# Patient Record
Sex: FEMALE | Race: WHITE | NOT HISPANIC OR LATINO | Employment: FULL TIME | URBAN - METROPOLITAN AREA
[De-identification: names, ages, dates, MRNs, and addresses within clinical notes are randomized per-mention and may not be internally consistent; named-entity substitution may affect disease eponyms.]

---

## 2023-12-19 ENCOUNTER — OFFICE VISIT (OUTPATIENT)
Dept: CARDIOLOGY | Facility: CLINIC | Age: 53
End: 2023-12-19
Payer: COMMERCIAL

## 2023-12-19 VITALS
DIASTOLIC BLOOD PRESSURE: 82 MMHG | HEIGHT: 64 IN | BODY MASS INDEX: 27.14 KG/M2 | TEMPERATURE: 98 F | WEIGHT: 159 LBS | SYSTOLIC BLOOD PRESSURE: 127 MMHG | HEART RATE: 80 BPM

## 2023-12-19 DIAGNOSIS — I36.1 NONRHEUMATIC TRICUSPID VALVE REGURGITATION: Primary | ICD-10-CM

## 2023-12-19 DIAGNOSIS — I49.1 PAC (PREMATURE ATRIAL CONTRACTION): ICD-10-CM

## 2023-12-19 PROCEDURE — 99214 OFFICE O/P EST MOD 30 MIN: CPT | Performed by: INTERNAL MEDICINE

## 2023-12-19 PROCEDURE — 1036F TOBACCO NON-USER: CPT | Performed by: INTERNAL MEDICINE

## 2023-12-19 RX ORDER — SUMATRIPTAN 20 MG/1
SPRAY NASAL EVERY 24 HOURS
COMMUNITY

## 2023-12-19 RX ORDER — METOPROLOL SUCCINATE 25 MG/1
25 TABLET, EXTENDED RELEASE ORAL DAILY
COMMUNITY
End: 2024-01-15

## 2023-12-19 NOTE — PROGRESS NOTES
"Chief Complaint:   Valve Disorder     History of Present Illness     Antonia Reeves is a 53 y.o. female presenting with TR.  Dx Moderate TR last year - had negative stress echo and negative pulmonary eval.  She has gained 25# since last year and feels that her NIELSON is worse.  Mild ankle edema.  No HX of PAH.       Previous History     Past Medical History:  She has a past medical history of Nonrheumatic tricuspid valve regurgitation (12/19/2023) and PAC (premature atrial contraction) (12/19/2023).    Past Surgical History:  She has no past surgical history on file.      Social History:  She reports that she has quit smoking. Her smoking use included cigarettes. She has a 2.50 pack-year smoking history. She has never used smokeless tobacco. No history on file for alcohol use and drug use.    Family History:  No family history on file.     Allergies:  Patient has no known allergies.    Outpatient Medications:  Current Outpatient Medications   Medication Instructions    metoprolol succinate XL (TOPROL-XL) 25 mg, oral, Daily    SUMAtriptan (Imitrex) 20 mg/actuation nasal spray Every 24 hours       Physical Examination   Vitals:  Visit Vitals  /82 (BP Location: Right arm)   Pulse 80   Temp 36.7 °C (98 °F)   Ht 1.626 m (5' 4\")   Wt 72.1 kg (159 lb)   BMI 27.29 kg/m²   Smoking Status Former   BSA 1.8 m²      Physical Exam  Vitals reviewed.   Constitutional:       General: She is not in acute distress.     Appearance: Normal appearance.   HENT:      Head: Normocephalic and atraumatic.      Nose: Nose normal.   Eyes:      Conjunctiva/sclera: Conjunctivae normal.   Cardiovascular:      Rate and Rhythm: Normal rate and regular rhythm.      Pulses: Normal pulses.      Heart sounds: No murmur heard.  Pulmonary:      Effort: Pulmonary effort is normal. No respiratory distress.      Breath sounds: Normal breath sounds. No wheezing, rhonchi or rales.   Abdominal:      General: Bowel sounds are normal. There is no distension.    " "  Palpations: Abdomen is soft.      Tenderness: There is no abdominal tenderness.   Musculoskeletal:         General: No swelling.      Right lower leg: No edema.      Left lower leg: No edema.   Skin:     General: Skin is warm and dry.      Capillary Refill: Capillary refill takes less than 2 seconds.   Neurological:      General: No focal deficit present.      Mental Status: She is alert.   Psychiatric:         Mood and Affect: Mood normal.            Labs/Imaging/Cardiac Studies     Last Labs:  CBC -  No results found for: \"WBC\", \"HGB\", \"HCT\", \"MCV\", \"PLT\"    CMP -  No results found for: \"CALCIUM\", \"PHOS\", \"PROT\", \"ALBUMIN\", \"AST\", \"ALT\", \"ALKPHOS\", \"BILITOT\"    LIPID PANEL -   No results found for: \"CHOL\", \"HDL\", \"CHHDL\", \"LDL\", \"VLDL\", \"TRIG\", \"NHDL\"    RENAL FUNCTION PANEL -   No results found for: \"GLU\", \"K\", \"CHLOR\", \"PHOS\"    No results found for: \"BNP\", \"HGBA1C\"    ECG:    Echo:  No echocardiogram results found for the past 12 months       Assessment and Recommendations     Assessment/Plan     1. Nonrheumatic tricuspid valve regurgitation  Moderate TR of unknown etiology with progressive NIELSON, negative pulmonary evaluation.  No Hx of weight loss drugs or carcinoid.  Stress echo 1/2023 negative with mild-moderate TE and normal RVSP, negative bubble study. Will recheck Echo and if still with TR, then L/RHC to evaluate for PAH or CAD, shunt, etc.  - Transthoracic echo (TTE) complete; Future    2. PAC (premature atrial contraction)  Well controlled on Toprol.  - Transthoracic echo (TTE) complete; Future       Farrukh Segundo MD    Exclusive of any other services or procedures performed, I, Farrukh Segundo MD , spent 30 minutes in duration for this visit today.  This time consisted of chart review, obtaining history, and/or performing the exam as documented above as well as documenting the clinical information for the encounter in the electronic record, discussing treatment options, plans, and/or goals with patient, " family, and/or caregiver, refilling medications, updating the electronic record, ordering medicines, lab work, imaging, referrals, and/or procedures as documented above and communicating with other [facility] professionals. I have discussed the results of laboratory, radiology, and cardiology studies with the patient and their family/caregiver.

## 2023-12-28 ENCOUNTER — HOSPITAL ENCOUNTER (OUTPATIENT)
Dept: CARDIOLOGY | Facility: CLINIC | Age: 53
Discharge: HOME | End: 2023-12-28
Payer: COMMERCIAL

## 2023-12-28 VITALS — SYSTOLIC BLOOD PRESSURE: 138 MMHG | DIASTOLIC BLOOD PRESSURE: 81 MMHG

## 2023-12-28 DIAGNOSIS — R06.02 SHORTNESS OF BREATH: ICD-10-CM

## 2023-12-28 DIAGNOSIS — I49.1 PAC (PREMATURE ATRIAL CONTRACTION): ICD-10-CM

## 2023-12-28 DIAGNOSIS — I36.1 NONRHEUMATIC TRICUSPID VALVE REGURGITATION: ICD-10-CM

## 2023-12-28 LAB
AORTIC VALVE PEAK VELOCITY: 1.42
AV PEAK GRADIENT: 8.1
AVA (PEAK VEL): 2.19
EJECTION FRACTION APICAL 4 CHAMBER: 57.9
EJECTION FRACTION: 61
LEFT ATRIUM VOLUME AREA LENGTH INDEX BSA: 33.7
LEFT VENTRICLE INTERNAL DIMENSION DIASTOLE: 4.61 (ref 3.5–6)
LEFT VENTRICULAR OUTFLOW TRACT DIAMETER: 1.86
MITRAL VALVE E/A RATIO: 0.89
MITRAL VALVE E/E' RATIO: 6.93
RIGHT VENTRICLE FREE WALL PEAK S': 14
RIGHT VENTRICLE PEAK SYSTOLIC PRESSURE: 26.1
TRICUSPID ANNULAR PLANE SYSTOLIC EXCURSION: 2.7

## 2023-12-28 PROCEDURE — 93306 TTE W/DOPPLER COMPLETE: CPT

## 2023-12-28 PROCEDURE — 93306 TTE W/DOPPLER COMPLETE: CPT | Performed by: INTERNAL MEDICINE

## 2023-12-29 ENCOUNTER — LAB (OUTPATIENT)
Dept: LAB | Facility: LAB | Age: 53
End: 2023-12-29
Payer: COMMERCIAL

## 2023-12-29 DIAGNOSIS — R06.02 SHORTNESS OF BREATH: ICD-10-CM

## 2023-12-29 DIAGNOSIS — R06.02 SHORTNESS OF BREATH: Primary | ICD-10-CM

## 2023-12-29 PROCEDURE — 36415 COLL VENOUS BLD VENIPUNCTURE: CPT

## 2023-12-29 PROCEDURE — 83880 ASSAY OF NATRIURETIC PEPTIDE: CPT

## 2023-12-30 LAB — BNP SERPL-MCNC: 26 PG/ML (ref 0–99)

## 2024-01-13 DIAGNOSIS — I49.1 PAC (PREMATURE ATRIAL CONTRACTION): Primary | ICD-10-CM

## 2024-01-15 RX ORDER — METOPROLOL SUCCINATE 25 MG/1
25 TABLET, EXTENDED RELEASE ORAL DAILY
Qty: 90 TABLET | Refills: 3 | Status: SHIPPED | OUTPATIENT
Start: 2024-01-15

## 2024-02-23 RX ORDER — VALACYCLOVIR HYDROCHLORIDE 500 MG/1
TABLET, FILM COATED ORAL
COMMUNITY
Start: 2022-03-17

## 2024-02-23 RX ORDER — TACROLIMUS 0.3 MG/G
1 OINTMENT TOPICAL
COMMUNITY
Start: 2020-04-09

## 2024-03-13 ENCOUNTER — APPOINTMENT (OUTPATIENT)
Dept: PULMONOLOGY | Facility: CLINIC | Age: 54
End: 2024-03-13
Payer: COMMERCIAL

## 2024-04-01 ENCOUNTER — OFFICE VISIT (OUTPATIENT)
Dept: PULMONOLOGY | Facility: CLINIC | Age: 54
End: 2024-04-01
Payer: COMMERCIAL

## 2024-04-01 VITALS
TEMPERATURE: 98.4 F | OXYGEN SATURATION: 97 % | RESPIRATION RATE: 18 BRPM | HEIGHT: 64 IN | SYSTOLIC BLOOD PRESSURE: 116 MMHG | WEIGHT: 155 LBS | BODY MASS INDEX: 26.46 KG/M2 | HEART RATE: 74 BPM | DIASTOLIC BLOOD PRESSURE: 78 MMHG

## 2024-04-01 DIAGNOSIS — J12.82 PNEUMONIA DUE TO COVID-19 VIRUS: Primary | ICD-10-CM

## 2024-04-01 DIAGNOSIS — U07.1 PNEUMONIA DUE TO COVID-19 VIRUS: Primary | ICD-10-CM

## 2024-04-01 DIAGNOSIS — R06.02 SHORTNESS OF BREATH: ICD-10-CM

## 2024-04-01 PROCEDURE — 99204 OFFICE O/P NEW MOD 45 MIN: CPT | Performed by: STUDENT IN AN ORGANIZED HEALTH CARE EDUCATION/TRAINING PROGRAM

## 2024-04-01 PROCEDURE — 1036F TOBACCO NON-USER: CPT | Performed by: STUDENT IN AN ORGANIZED HEALTH CARE EDUCATION/TRAINING PROGRAM

## 2024-04-01 PROCEDURE — 99214 OFFICE O/P EST MOD 30 MIN: CPT | Performed by: STUDENT IN AN ORGANIZED HEALTH CARE EDUCATION/TRAINING PROGRAM

## 2024-04-01 RX ORDER — ALBUTEROL SULFATE 90 UG/1
2 AEROSOL, METERED RESPIRATORY (INHALATION) EVERY 4 HOURS PRN
Qty: 8.5 G | Refills: 5 | Status: SHIPPED | OUTPATIENT
Start: 2024-04-01 | End: 2025-04-01

## 2024-04-01 ASSESSMENT — ENCOUNTER SYMPTOMS
ARTHRALGIAS: 0
FEVER: 0
ABDOMINAL PAIN: 0
PALPITATIONS: 1
CHILLS: 0
ABDOMINAL DISTENTION: 0
UNEXPECTED WEIGHT CHANGE: 0

## 2024-04-01 ASSESSMENT — PAIN SCALES - GENERAL: PAINLEVEL: 0-NO PAIN

## 2024-04-01 NOTE — LETTER
April 1, 2024     Farrukh Segundo MD  [address] [place] [address]    Patient: Antonia Reeves   YOB: 1970   Date of Visit: 4/1/2024       Dear Dr. Farrukh Segundo MD:    Thank you for referring Antonia Reeves to me for evaluation. Below are my notes for this consultation.  If you have questions, please do not hesitate to call me. I look forward to following your patient along with you.       Sincerely,     Courtney Temple MD      CC: No Recipients  ______________________________________________________________________________________      Department of Medicine I Division of Pulmonary, Critical Care, and Sleep Medicine   [address] [place] [address]  Phone: [phone]  Fax: [phone]    History of Present Illness   Antonia Reeves is a 53 y.o. female presenting with dyspnea    Referred by:  Farrukh Segundo MD, for dyspnea. I have independently interviewed and examined the patient in the office and reviewed available records.    Follows with Dr. Segundo for TR, PAC's, tentative plan for C/RHC     Previously seen by Dr. Choudhury in April 2023 for similar symptoms--workup at the time included PFT (which was normal) and walk test (which was normal)--echo with moderate TR at the time, repeat demonstrates mild TR    Endorses a history of asthma many years ago--not currently on inhalers     Did have covid infection in August 2021   Did notice after covid infection having more frequent PAC and trouble with her breathing. Feels like she has not gotten back to baseline     Does endorse swelling in her hands and feet   Has also endorsed a rapid weight gain after menopause     Dyspnea happens with exertion---ie with stairs, also when her heart races, orthopnea; denies PND  Denies any cough   Occasional post nasal drip   Denies any rash   Pulmonary medications: none       Review of Systems  Review of Systems   Constitutional:  Negative for chills, fever and unexpected weight change.  "  Respiratory:          As per hpi   Cardiovascular:  Positive for palpitations and leg swelling. Negative for chest pain.   Gastrointestinal:  Negative for abdominal distention and abdominal pain.   Musculoskeletal:  Negative for arthralgias and gait problem.   Skin:  Negative for rash.     All other review of systems are negative and/or non-contributory.    Past Medical History   She has a past medical history of Nonrheumatic tricuspid valve regurgitation (12/19/2023) and PAC (premature atrial contraction) (12/19/2023).    Immunizations     Immunization History   Administered Date(s) Administered   • Pfizer Purple Cap SARS-CoV-2 12/01/2021, 12/28/2021       Medications and Allergies     Current Outpatient Medications   Medication Instructions   • metoprolol succinate XL (TOPROL-XL) 25 mg, oral, Daily   • SUMAtriptan (Imitrex) 20 mg/actuation nasal spray Every 24 hours   • tacrolimus (Protopic) 0.03 % ointment 1 Application, Topical   • valACYclovir (Valtrex) 500 mg tablet oral      Patient has no known allergies.    Social History   She reports that she has quit smoking. Her smoking use included cigarettes. She has a 2.50 pack-year smoking history. She has never used smokeless tobacco. No history on file for alcohol use and drug use.    Smoking History: 10 year smoking history (quit 25 years ago). No vaping. Denies any methamphetamine use.  Remote marijuana and cocaine use. 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  Has one dog at home and two cats       Family History   Mom:  bullous pemphigoid (was on infusions)   Dad: passed when she was young (not sure of any medical conditions)         Surgical History   She has no past surgical history on file.    Physical Exam   /78 (BP Location: Right arm, Patient Position: Sitting)   Pulse 74   Temp 36.9 °C (98.4 °F) (Temporal)   Resp 18   Ht 1.626 m (5' 4\")   Wt 70.3 kg (155 lb)   SpO2 97%   BMI " 26.61 kg/m²      Physical Exam  Vitals reviewed.   Constitutional:       General: She is awake.   Cardiovascular:      Rate and Rhythm: Normal rate and regular rhythm.   Pulmonary:      Effort: Pulmonary effort is normal.      Breath sounds: Normal breath sounds.   Musculoskeletal:      Comments: BL nonpitting edema    Neurological:      Mental Status: She is alert and oriented to person, place, and time.   Psychiatric:         Attention and Perception: Attention and perception normal.         Behavior: Behavior normal.          Results   Pulmonary Function Tests:  3/24/2023  FEV1/FVC: 77 FEV1: 105% predicted  T% RV/T% pred   DLCO: 115% predicted     Chest Radiograph:  CHEST 2 VIEW     Narrative  Interpreted By:  CHRISTIANO HOLT MD  MRN: [id number]  Patient Name: ERNESTINA RIZVI    STUDY:   CHEST 2 VIEW PA AND LAT;  2023 10:03 am    INDICATION:  DCH02.    COMPARISON:  None.    ACCESSION NUMBER(S):  [id number]    ORDERING CLINICIAN:  LISA CARLOS    FINDINGS:  CARDIOMEDIASTINAL SILHOUETTE AND VASCULATURE:    Cardiac size:  Within normal limits considering technique    Aortic shadow:  Within normal limits considering technique    Mediastinal contours: Within normal limits considering technique    Pulmonary vasculature:  The central vasculature is unremarkable    LUNGS:  Lungs are clear.    ABDOMEN AND OTHER FINDINGS:  No remarkable upper abdominal findings.    BONES:  No acute osseous changes.    Impression  1.  No active cardiopulmonary disease.      Chest CT Scan:  No results found for this or any previous visit from the past 365 days.       ECHO:  2023  PHYSICIAN INTERPRETATION:  Left Ventricle: The left ventricular systolic function is normal. There are no regional wall motion abnormalities. The left ventricular cavity size is normal. Spectral Doppler shows an impaired relaxation pattern of left ventricular diastolic filling.  Left Atrium: The left atrium is mildly dilated.  Right Ventricle: The  "right ventricle is upper limits of normal in size. There is normal right ventricular global systolic function.  Right Atrium: The right atrium is normal in size.  Aortic Valve: The aortic valve appears structurally normal. There is no evidence of aortic valve regurgitation. The peak instantaneous gradient of the aortic valve is 8.1 mmHg.  Mitral Valve: The mitral valve is normal in structure. There is mild mitral valve regurgitation.  Tricuspid Valve: The tricuspid valve is structurally normal. There is mild tricuspid regurgitation.  Pulmonic Valve: The pulmonic valve is structurally normal. There is no indication of pulmonic valve regurgitation.  Pericardium: There is no pericardial effusion noted.  Aorta: The aortic root is normal.  In comparison to the previous echocardiogram(s): There are no prior studies on this patient for comparison purposes.        CONCLUSIONS:   1. Left ventricular systolic function is normal.   2. Spectral Doppler shows an impaired relaxation pattern of left ventricular diastolic filling.       Labs:  No results found for: \"WBC\", \"HGB\", \"HCT\", \"MCV\", \"PLT\"    No results found for: \"CREATININE\", \"BUN\", \"NA\", \"K\", \"CL\", \"CO2\"     No results found for: \"ZORAN\", \"RF\", \"SEDRATE\"     IgE: No results found for: \"IGE\"   Respiratory Allergy Panel:  AEC: No results found for: \"EOSABS\", \"EOSPCT\"    Cultures:  No results found for: \"AFBCX\"   No results found for: \"RESPCULTCYFI\"    No results found for the last 90 days.  C     Assessment and Plan       53 year old woman with prior history of asthma, PAC, Tricuspid regurgitation and persistent dyspnea following covid infection in August 2021--mostly with activity.  PFTs and walk test are unremarkable.  She is following with cardiology and is on metoprolol for her PAC, there is possible plan for LHC/RHC.      Recommendations   CPET with spirometry at peak exercise   Pulm rehab referral (post covid)   Albuterol inh prior to activity   Consider RHC if " testing/treatment  above equivocal           Follow-up:  3 months or sooner     Courtney Temple MD  04/01/2024

## 2024-04-01 NOTE — PROGRESS NOTES
Department of Medicine I Division of Pulmonary, Critical Care, and Sleep Medicine   [id number] [place] [address]  Phone: [phone]  Fax: [phone]    History of Present Illness   Antonia Reeves is a 53 y.o. female presenting with dyspnea    Referred by:  Farrukh Segundo MD, for dyspnea. I have independently interviewed and examined the patient in the office and reviewed available records.    Follows with Dr. Segundo for TR, PAC's, tentative plan for LHC/RHC     Previously seen by Dr. Choudhury in April 2023 for similar symptoms--workup at the time included PFT (which was normal) and walk test (which was normal)--echo with moderate TR at the time, repeat demonstrates mild TR    Endorses a history of asthma many years ago--not currently on inhalers     Did have covid infection in August 2021   Did notice after covid infection having more frequent PAC and trouble with her breathing. Feels like she has not gotten back to baseline     Does endorse swelling in her hands and feet   Has also endorsed a rapid weight gain after menopause     Dyspnea happens with exertion---ie with stairs, also when her heart races, orthopnea; denies PND  Denies any cough   Occasional post nasal drip   Denies any rash   Pulmonary medications: none       Review of Systems  Review of Systems   Constitutional:  Negative for chills, fever and unexpected weight change.   Respiratory:          As per hpi   Cardiovascular:  Positive for palpitations and leg swelling. Negative for chest pain.   Gastrointestinal:  Negative for abdominal distention and abdominal pain.   Musculoskeletal:  Negative for arthralgias and gait problem.   Skin:  Negative for rash.     All other review of systems are negative and/or non-contributory.    Past Medical History   She has a past medical history of Nonrheumatic tricuspid valve regurgitation (12/19/2023) and PAC (premature atrial contraction) (12/19/2023).    Immunizations  "    Immunization History   Administered Date(s) Administered    Pfizer Purple Cap SARS-CoV-2 12/01/2021, 12/28/2021       Medications and Allergies     Current Outpatient Medications   Medication Instructions    metoprolol succinate XL (TOPROL-XL) 25 mg, oral, Daily    SUMAtriptan (Imitrex) 20 mg/actuation nasal spray Every 24 hours    tacrolimus (Protopic) 0.03 % ointment 1 Application, Topical    valACYclovir (Valtrex) 500 mg tablet oral      Patient has no known allergies.    Social History   She reports that she has quit smoking. Her smoking use included cigarettes. She has a 2.50 pack-year smoking history. She has never used smokeless tobacco. No history on file for alcohol use and drug use.    Smoking History: 10 year smoking history (quit 25 years ago). No vaping. Denies any methamphetamine use.  Remote marijuana and cocaine use. 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  Has one dog at home and two cats       Family History   Mom:  bullous pemphigoid (was on infusions)   Dad: passed when she was young (not sure of any medical conditions)         Surgical History   She has no past surgical history on file.    Physical Exam   /78 (BP Location: Right arm, Patient Position: Sitting)   Pulse 74   Temp 36.9 °C (98.4 °F) (Temporal)   Resp 18   Ht 1.626 m (5' 4\")   Wt 70.3 kg (155 lb)   SpO2 97%   BMI 26.61 kg/m²      Physical Exam  Vitals reviewed.   Constitutional:       General: She is awake.   Cardiovascular:      Rate and Rhythm: Normal rate and regular rhythm.   Pulmonary:      Effort: Pulmonary effort is normal.      Breath sounds: Normal breath sounds.   Musculoskeletal:      Comments: BL nonpitting edema    Neurological:      Mental Status: She is alert and oriented to person, place, and time.   Psychiatric:         Attention and Perception: Attention and perception normal.         Behavior: Behavior normal.          Results "   Pulmonary Function Tests:  3/24/2023  FEV1/FVC: 77 FEV1: 105% predicted  T% RV/T% pred   DLCO: 115% predicted     Chest Radiograph:  CHEST 2 VIEW     Narrative  Interpreted By:  CHRISTIANO HOLT MD  MRN: [id number]  Patient Name: ERNESTINA RIZVI    STUDY:  TH CHEST 2 VIEW PA AND LAT;  2023 10:03 am    INDICATION:  DCH02.    COMPARISON:  None.    ACCESSION NUMBER(S):  [id number]    ORDERING CLINICIAN:  LISA CARLOS    FINDINGS:  CARDIOMEDIASTINAL SILHOUETTE AND VASCULATURE:    Cardiac size:  Within normal limits considering technique    Aortic shadow:  Within normal limits considering technique    Mediastinal contours: Within normal limits considering technique    Pulmonary vasculature:  The central vasculature is unremarkable    LUNGS:  Lungs are clear.    ABDOMEN AND OTHER FINDINGS:  No remarkable upper abdominal findings.    BONES:  No acute osseous changes.    Impression  1.  No active cardiopulmonary disease.      Chest CT Scan:  No results found for this or any previous visit from the past 365 days.       ECHO:  2023  PHYSICIAN INTERPRETATION:  Left Ventricle: The left ventricular systolic function is normal. There are no regional wall motion abnormalities. The left ventricular cavity size is normal. Spectral Doppler shows an impaired relaxation pattern of left ventricular diastolic filling.  Left Atrium: The left atrium is mildly dilated.  Right Ventricle: The right ventricle is upper limits of normal in size. There is normal right ventricular global systolic function.  Right Atrium: The right atrium is normal in size.  Aortic Valve: The aortic valve appears structurally normal. There is no evidence of aortic valve regurgitation. The peak instantaneous gradient of the aortic valve is 8.1 mmHg.  Mitral Valve: The mitral valve is normal in structure. There is mild mitral valve regurgitation.  Tricuspid Valve: The tricuspid valve is structurally normal. There is mild tricuspid  "regurgitation.  Pulmonic Valve: The pulmonic valve is structurally normal. There is no indication of pulmonic valve regurgitation.  Pericardium: There is no pericardial effusion noted.  Aorta: The aortic root is normal.  In comparison to the previous echocardiogram(s): There are no prior studies on this patient for comparison purposes.        CONCLUSIONS:   1. Left ventricular systolic function is normal.   2. Spectral Doppler shows an impaired relaxation pattern of left ventricular diastolic filling.       Labs:  No results found for: \"WBC\", \"HGB\", \"HCT\", \"MCV\", \"PLT\"    No results found for: \"CREATININE\", \"BUN\", \"NA\", \"K\", \"CL\", \"CO2\"     No results found for: \"ZORAN\", \"RF\", \"SEDRATE\"     IgE: No results found for: \"IGE\"   Respiratory Allergy Panel:  AEC: No results found for: \"EOSABS\", \"EOSPCT\"    Cultures:  No results found for: \"AFBCX\"   No results found for: \"RESPCULTCYFI\"    No results found for the last 90 days.  C     Assessment and Plan       53 year old woman with prior history of asthma, PAC, Tricuspid regurgitation and persistent dyspnea following covid infection in August 2021--mostly with activity.  PFTs and walk test are unremarkable.  She is following with cardiology and is on metoprolol for her PAC, there is possible plan for LHC/RHC.      Recommendations   CPET with spirometry at peak exercise   Pulm rehab referral (post covid)   Albuterol inh prior to activity   Consider RHC if testing/treatment  above equivocal           Follow-up:  3 months or sooner     Courtney Temple MD  04/01/2024    "

## 2024-04-01 NOTE — PATIENT INSTRUCTIONS
Thank you for visiting the [facility]monary [facility] today.   Your breathing medications: try albuterol prior to activity   Tests: cardiopulmonary exercise test (they will call you)   Referrals: pulmonary rehab  Return in 3 months or after testing   If you have questions or concerns, call [phone] (option 4)

## 2024-04-02 ENCOUNTER — TELEPHONE (OUTPATIENT)
Dept: CARDIAC REHAB | Facility: CLINIC | Age: 54
End: 2024-04-02
Payer: COMMERCIAL

## 2024-04-02 NOTE — TELEPHONE ENCOUNTER
Spoke with patient about pulmonary rehab. Pt said that she works across from [facility] in [place]. Referral faxed there. - Candida Menchaca RRT

## 2024-04-18 ENCOUNTER — HOSPITAL ENCOUNTER (OUTPATIENT)
Dept: CARDIOLOGY | Facility: HOSPITAL | Age: 54
Discharge: HOME | End: 2024-04-18
Payer: COMMERCIAL

## 2024-04-18 DIAGNOSIS — R06.02 SHORTNESS OF BREATH: ICD-10-CM

## 2024-04-18 PROCEDURE — 94621 CARDIOPULM EXERCISE TESTING: CPT

## 2024-04-18 PROCEDURE — 94621 CARDIOPULM EXERCISE TESTING: CPT | Performed by: INTERNAL MEDICINE

## 2024-04-22 ENCOUNTER — TELEPHONE (OUTPATIENT)
Dept: CARDIOLOGY | Facility: CLINIC | Age: 54
End: 2024-04-22
Payer: COMMERCIAL

## 2024-04-22 NOTE — TELEPHONE ENCOUNTER
----- Message from Susie Nick RN sent at 4/22/2024  8:44 AM EDT -----    ----- Message -----  From: Farrukh Segundo MD  Sent: 4/22/2024   8:26 AM EDT  To: [place] [id number] Clinical Support Staff    Abnormal cardiopulmonary stress test.  Advise Left and Right cath.  OV if she wishes to discuss in person first.  Start ASA 81 every day.  ----- Message -----  From: Marbella Syngo - Cardiology Results In  Sent: 4/19/2024  11:57 AM EDT  To: Farrukh Segundo MD

## 2024-04-23 ENCOUNTER — CLINICAL SUPPORT (OUTPATIENT)
Dept: CARDIOLOGY | Facility: CLINIC | Age: 54
End: 2024-04-23
Payer: COMMERCIAL

## 2024-04-23 DIAGNOSIS — R06.02 SHORTNESS OF BREATH: Primary | ICD-10-CM

## 2024-04-23 DIAGNOSIS — I36.1 NONRHEUMATIC TRICUSPID VALVE REGURGITATION: ICD-10-CM

## 2024-04-23 DIAGNOSIS — R06.02 SHORTNESS OF BREATH: ICD-10-CM

## 2024-04-23 LAB
ANION GAP SERPL CALC-SCNC: 11 MMOL/L (ref 10–20)
BASOPHILS # BLD AUTO: 0.02 X10*3/UL (ref 0–0.1)
BASOPHILS NFR BLD AUTO: 0.6 %
BUN SERPL-MCNC: 17 MG/DL (ref 6–23)
CALCIUM SERPL-MCNC: 8.7 MG/DL (ref 8.6–10.3)
CHLORIDE SERPL-SCNC: 107 MMOL/L (ref 98–107)
CO2 SERPL-SCNC: 25 MMOL/L (ref 21–32)
CREAT SERPL-MCNC: 0.61 MG/DL (ref 0.5–1.05)
EGFRCR SERPLBLD CKD-EPI 2021: >90 ML/MIN/1.73M*2
EOSINOPHIL # BLD AUTO: 0.06 X10*3/UL (ref 0–0.7)
EOSINOPHIL NFR BLD AUTO: 1.7 %
ERYTHROCYTE [DISTWIDTH] IN BLOOD BY AUTOMATED COUNT: 12.2 % (ref 11.5–14.5)
GLUCOSE SERPL-MCNC: 107 MG/DL (ref 74–99)
HCT VFR BLD AUTO: 40.8 % (ref 36–46)
HGB BLD-MCNC: 13.6 G/DL (ref 12–16)
IMM GRANULOCYTES # BLD AUTO: 0.01 X10*3/UL (ref 0–0.7)
IMM GRANULOCYTES NFR BLD AUTO: 0.3 % (ref 0–0.9)
LYMPHOCYTES # BLD AUTO: 1.21 X10*3/UL (ref 1.2–4.8)
LYMPHOCYTES NFR BLD AUTO: 34.5 %
MCH RBC QN AUTO: 30.8 PG (ref 26–34)
MCHC RBC AUTO-ENTMCNC: 33.3 G/DL (ref 32–36)
MCV RBC AUTO: 93 FL (ref 80–100)
MONOCYTES # BLD AUTO: 0.33 X10*3/UL (ref 0.1–1)
MONOCYTES NFR BLD AUTO: 9.4 %
NEUTROPHILS # BLD AUTO: 1.88 X10*3/UL (ref 1.2–7.7)
NEUTROPHILS NFR BLD AUTO: 53.5 %
NRBC BLD-RTO: 0 /100 WBCS (ref 0–0)
PLATELET # BLD AUTO: 203 X10*3/UL (ref 150–450)
POTASSIUM SERPL-SCNC: 4 MMOL/L (ref 3.5–5.3)
RBC # BLD AUTO: 4.41 X10*6/UL (ref 4–5.2)
SODIUM SERPL-SCNC: 139 MMOL/L (ref 136–145)
WBC # BLD AUTO: 3.5 X10*3/UL (ref 4.4–11.3)

## 2024-04-23 PROCEDURE — 36415 COLL VENOUS BLD VENIPUNCTURE: CPT

## 2024-04-23 PROCEDURE — 80048 BASIC METABOLIC PNL TOTAL CA: CPT

## 2024-04-23 PROCEDURE — 85025 COMPLETE CBC W/AUTO DIFF WBC: CPT

## 2024-05-09 RX ORDER — NAPROXEN SODIUM 220 MG/1
81 TABLET, FILM COATED ORAL DAILY
Status: CANCELLED | OUTPATIENT
Start: 2024-05-09

## 2024-05-10 ENCOUNTER — HOSPITAL ENCOUNTER (OUTPATIENT)
Facility: HOSPITAL | Age: 54
Setting detail: OUTPATIENT SURGERY
Discharge: HOME | End: 2024-05-10
Attending: INTERNAL MEDICINE | Admitting: INTERNAL MEDICINE
Payer: COMMERCIAL

## 2024-05-10 VITALS
OXYGEN SATURATION: 96 % | TEMPERATURE: 98.4 F | BODY MASS INDEX: 26.6 KG/M2 | RESPIRATION RATE: 18 BRPM | DIASTOLIC BLOOD PRESSURE: 65 MMHG | WEIGHT: 154.98 LBS | SYSTOLIC BLOOD PRESSURE: 105 MMHG | HEART RATE: 69 BPM

## 2024-05-10 DIAGNOSIS — R94.30 ABNORMAL RESULT OF CARDIOVASCULAR FUNCTION STUDY, UNSPECIFIED: ICD-10-CM

## 2024-05-10 DIAGNOSIS — I27.20 PULMONARY HYPERTENSION, UNSPECIFIED (MULTI): ICD-10-CM

## 2024-05-10 DIAGNOSIS — R06.02 SHORTNESS OF BREATH: Primary | ICD-10-CM

## 2024-05-10 DIAGNOSIS — I36.1 NONRHEUMATIC TRICUSPID (VALVE) INSUFFICIENCY: ICD-10-CM

## 2024-05-10 PROCEDURE — 2500000004 HC RX 250 GENERAL PHARMACY W/ HCPCS (ALT 636 FOR OP/ED): Performed by: INTERNAL MEDICINE

## 2024-05-10 PROCEDURE — 99152 MOD SED SAME PHYS/QHP 5/>YRS: CPT | Performed by: INTERNAL MEDICINE

## 2024-05-10 PROCEDURE — C1887 CATHETER, GUIDING: HCPCS | Performed by: INTERNAL MEDICINE

## 2024-05-10 PROCEDURE — 93460 R&L HRT ART/VENTRICLE ANGIO: CPT | Performed by: INTERNAL MEDICINE

## 2024-05-10 PROCEDURE — 7100000010 HC PHASE TWO TIME - EACH INCREMENTAL 1 MINUTE: Performed by: INTERNAL MEDICINE

## 2024-05-10 PROCEDURE — 2500000004 HC RX 250 GENERAL PHARMACY W/ HCPCS (ALT 636 FOR OP/ED): Performed by: NURSE PRACTITIONER

## 2024-05-10 PROCEDURE — 2720000007 HC OR 272 NO HCPCS: Performed by: INTERNAL MEDICINE

## 2024-05-10 PROCEDURE — C1760 CLOSURE DEV, VASC: HCPCS | Performed by: INTERNAL MEDICINE

## 2024-05-10 PROCEDURE — 99153 MOD SED SAME PHYS/QHP EA: CPT | Performed by: INTERNAL MEDICINE

## 2024-05-10 PROCEDURE — 7100000009 HC PHASE TWO TIME - INITIAL BASE CHARGE: Performed by: INTERNAL MEDICINE

## 2024-05-10 PROCEDURE — 2500000005 HC RX 250 GENERAL PHARMACY W/O HCPCS: Performed by: INTERNAL MEDICINE

## 2024-05-10 PROCEDURE — 2500000001 HC RX 250 WO HCPCS SELF ADMINISTERED DRUGS (ALT 637 FOR MEDICARE OP): Performed by: NURSE PRACTITIONER

## 2024-05-10 PROCEDURE — 99222 1ST HOSP IP/OBS MODERATE 55: CPT | Performed by: NURSE PRACTITIONER

## 2024-05-10 PROCEDURE — C1769 GUIDE WIRE: HCPCS | Performed by: INTERNAL MEDICINE

## 2024-05-10 PROCEDURE — C1894 INTRO/SHEATH, NON-LASER: HCPCS | Performed by: INTERNAL MEDICINE

## 2024-05-10 RX ORDER — NAPROXEN SODIUM 220 MG/1
324 TABLET, FILM COATED ORAL ONCE
Status: COMPLETED | OUTPATIENT
Start: 2024-05-10 | End: 2024-05-10

## 2024-05-10 RX ORDER — ACETAMINOPHEN 325 MG/1
650 TABLET ORAL EVERY 6 HOURS PRN
Status: CANCELLED | OUTPATIENT
Start: 2024-05-10

## 2024-05-10 RX ORDER — ACETAMINOPHEN 650 MG/1
650 SUPPOSITORY RECTAL EVERY 6 HOURS PRN
Status: CANCELLED | OUTPATIENT
Start: 2024-05-10

## 2024-05-10 RX ORDER — SODIUM CHLORIDE 9 MG/ML
100 INJECTION, SOLUTION INTRAVENOUS CONTINUOUS
Status: DISCONTINUED | OUTPATIENT
Start: 2024-05-10 | End: 2024-05-10

## 2024-05-10 RX ORDER — ACETAMINOPHEN 160 MG/5ML
650 SOLUTION ORAL EVERY 6 HOURS PRN
Status: CANCELLED | OUTPATIENT
Start: 2024-05-10

## 2024-05-10 RX ORDER — FENTANYL CITRATE 50 UG/ML
INJECTION, SOLUTION INTRAMUSCULAR; INTRAVENOUS AS NEEDED
Status: DISCONTINUED | OUTPATIENT
Start: 2024-05-10 | End: 2024-05-10 | Stop reason: HOSPADM

## 2024-05-10 RX ORDER — MIDAZOLAM HYDROCHLORIDE 1 MG/ML
INJECTION, SOLUTION INTRAMUSCULAR; INTRAVENOUS AS NEEDED
Status: DISCONTINUED | OUTPATIENT
Start: 2024-05-10 | End: 2024-05-10 | Stop reason: HOSPADM

## 2024-05-10 RX ORDER — HEPARIN SODIUM 1000 [USP'U]/ML
INJECTION, SOLUTION INTRAVENOUS; SUBCUTANEOUS AS NEEDED
Status: DISCONTINUED | OUTPATIENT
Start: 2024-05-10 | End: 2024-05-10 | Stop reason: HOSPADM

## 2024-05-10 RX ORDER — LIDOCAINE HYDROCHLORIDE 20 MG/ML
INJECTION, SOLUTION INFILTRATION; PERINEURAL AS NEEDED
Status: DISCONTINUED | OUTPATIENT
Start: 2024-05-10 | End: 2024-05-10 | Stop reason: HOSPADM

## 2024-05-10 RX ORDER — SODIUM CHLORIDE 9 MG/ML
3 INJECTION, SOLUTION INTRAVENOUS CONTINUOUS
Status: DISCONTINUED | OUTPATIENT
Start: 2024-05-10 | End: 2024-05-10 | Stop reason: HOSPADM

## 2024-05-10 RX ADMIN — ASPIRIN 81 MG 324 MG: 81 TABLET ORAL at 07:45

## 2024-05-10 RX ADMIN — SODIUM CHLORIDE 100 ML/HR: 9 INJECTION, SOLUTION INTRAVENOUS at 08:00

## 2024-05-10 ASSESSMENT — ENCOUNTER SYMPTOMS
PSYCHIATRIC NEGATIVE: 1
NEUROLOGICAL NEGATIVE: 1
CARDIOVASCULAR NEGATIVE: 1
EYES NEGATIVE: 1
HEMATOLOGIC/LYMPHATIC NEGATIVE: 1
MUSCULOSKELETAL NEGATIVE: 1
FATIGUE: 1
ALLERGIC/IMMUNOLOGIC NEGATIVE: 1
SHORTNESS OF BREATH: 1
GASTROINTESTINAL NEGATIVE: 1
ENDOCRINE NEGATIVE: 1

## 2024-05-10 ASSESSMENT — PAIN - FUNCTIONAL ASSESSMENT: PAIN_FUNCTIONAL_ASSESSMENT: 0-10

## 2024-05-10 ASSESSMENT — PAIN SCALES - GENERAL: PAINLEVEL_OUTOF10: 0 - NO PAIN

## 2024-05-10 ASSESSMENT — COLUMBIA-SUICIDE SEVERITY RATING SCALE - C-SSRS
6. HAVE YOU EVER DONE ANYTHING, STARTED TO DO ANYTHING, OR PREPARED TO DO ANYTHING TO END YOUR LIFE?: NO
2. HAVE YOU ACTUALLY HAD ANY THOUGHTS OF KILLING YOURSELF?: NO
1. IN THE PAST MONTH, HAVE YOU WISHED YOU WERE DEAD OR WISHED YOU COULD GO TO SLEEP AND NOT WAKE UP?: NO

## 2024-05-10 NOTE — DISCHARGE INSTRUCTIONS
Follow up with Dr. Segundo as scheduled on June 24, 2024.           CARDIAC CATHETERIZATION DISCHARGE INSTRUCTIONS     FOR SUDDEN AND SEVERE CHEST PAIN, SHORTNESS OF BREATH, EXCESSIVE BLEEDING, SIGNS OF STROKE, OR CHANGES IN MENTAL STATUS YOU SHOULD CALL 911 IMMEDIATELY.     If your provider has prescribed aspirin and/or clopidogrel (Plavix), or prasugrel (Effient), or ticagrelor (Brilinta), DO NOT STOP THESE MEDICATIONS for any reason without talking to your cardiologist first. If any of these were prescribed, you must take them every day without missing a single dose. If you are getting low on these medications, contact your provider immediately for a refill.     FOR NEXT 24 HOURS  - Upon discharge, you should return home and rest for the remainder of the day and evening. You do not have to stay on bed rest but should not be very active.  It is recommended a responsible adult be with you for the first 24 hours after the procedure.    - No driving for 24 hours after procedure. Please arrange for someone to drive you home from the hospital today.     - Do not drive, operate machinery, or use power tools for 24 hours after your procedure.     - Do not make any legal decisions for 24 hours after your procedure.     - Do not drink alcoholic beverages for 24 hours after your procedure.    WOUND CARE   *FOR FEMORAL (LEG) ACCESS*  ·      Avoid heavy lifting (over 10 pounds) for 7 days, squatting or excessive bending for 2 days, and strenuous exercise for 7 days.  ·      No submerged bathing, swimming, or hot tubs for the next 7 days, or until fully healed.  ·      Avoid sexual activity for 3-4 days until any groin discomfort has ceased.     *FOR RADIAL (WRIST) ACCESS*  ·      No lifting more than 5 pounds or excessive use of the wrist for 24 hours - for example, treat your wrist as if it is sprained.  ·      Do not engage in vigorous activities (tennis, golf, bowling, weights) for at least 48 hours after the procedure.  ·       Do not submerge the wrist for 7 days after the procedure.  ·      You should expect mild tingling in your hand and tenderness at the puncture site for up to 3 days.    - The transparent dressing should be removed from the site 24 hours after the procedure.  Wash the site gently with soap and water. Rinse well and pat dry. Keep the area clean and dry. You may apply a Band-Aid to the site. Avoid lotions, ointments, or powders until fully healed.     - You may shower the day after your procedure.      - It is normal to notice a small bruise around the puncture site and/or a small grape sized or smaller lump. Any large bruising or large lump warrants a call to the office.     - If bleeding should occur, lay down and apply pressure to the affected area for 10 minutes.  If the bleeding stops notify your physician.  If there is a large amount of bleeding or spurting of blood CALL 911 immediately.  DO NOT drive yourself to the hospital.    - You may experience some tenderness, bruising or minimal inflammation.  If you have any concerns, you may contact the Cath Lab or if any of these symptoms become excessive, contact your cardiologist or go to the emergency room.     OTHER INSTRUCTIONS  - You may take acetaminophen (Tylenol) as directed for discomfort.  If pain is not relieved with acetaminophen (Tylenol), contact your doctor.    - If you notice or experience any of the following, you should notify your doctor or seek medical attention  Chest pain or discomfort  Change in mental status or weakness in extremities.  Dizziness, light headedness, or feeling faint.  Change in the site where the procedure was performed, such as bleeding or an increased area of bruising or swelling.  Tingling, numbness, pain, or coolness in the leg/arm beyond the site where the procedure was performed.  Signs of infection (i.e. shaking chills, temperature > 100 degrees Fahrenheit, warmth, redness) in the leg/arm area where the procedure was  performed.  Changes in urination   Bloody or black stools  Vomiting blood  Severe nose bleeds  Any excessive bleeding    - If you DO NOT have an appointment with your cardiologist within 2-4 weeks following your procedure, please contact their office.

## 2024-05-10 NOTE — POST-PROCEDURE NOTE
Physician Transition of Care Summary  Invasive Cardiovascular Lab    Procedure Date: 5/10/2024  Attending:    * Farrukh Segundo - Primary  Resident/Fellow/Other Assistant: Surgeons and Role:  * No surgeons found with a matching role *    Indications:   Pre-op Diagnosis     * Shortness of breath [R06.02]    Post-procedure diagnosis:   Post-op Diagnosis     * Shortness of breath [R06.02]    Procedure(s):   Left & Right Heart Cath w Angiography & LV  42924 - ME R & L HRT CATH WINJX HRT ART& L VENTR IMG        Procedure Findings:   Normal Right and Left heart cath    Description of the Procedure:   Right brachial 5 Fr RHC, 6 Fr radial LHC    Complications:   None    Stents/Implants:   Implants       No implant documentation for this case.            Anticoagulation/Antiplatelet Plan:   None    Estimated Blood Loss:   10 mL    Anesthesia: Moderate Sedation Anesthesia Staff: No anesthesia staff entered.    Any Specimen(s) Removed:   No specimens collected during this procedure.    Disposition:   Home      Electronically signed by: Farrukh Segundo MD, 5/10/2024 11:19 AM

## 2024-05-10 NOTE — H&P
History Of Present Illness  Antonia Reeves is a 54 y.o. female presenting with sob; here for LHC/RHC with Dr. Segundo. PMHx significant for Tricuspid Regurgitation, PAC.      Past Medical History  She has a past medical history of Nonrheumatic tricuspid valve regurgitation (2023) and PAC (premature atrial contraction) (2023).    Surgical History  She has a past surgical history that includes Appendectomy and  section, classic.     Social History  She reports that she has quit smoking. Her smoking use included cigarettes. She has a 2.5 pack-year smoking history. She has never used smokeless tobacco. She reports current alcohol use. She reports that she does not use drugs.    Family History  Family History   Problem Relation Name Age of Onset    Hip fracture Mother      Other (gunshot) Father          Allergies  Patient has no known allergies.    Home Medications  No current facility-administered medications on file prior to encounter.     Current Outpatient Medications on File Prior to Encounter   Medication Sig Dispense Refill    albuterol (ProAir HFA) 90 mcg/actuation inhaler Inhale 2 puffs every 4 hours if needed for wheezing or shortness of breath. 8.5 g 5    metoprolol succinate XL (Toprol-XL) 25 mg 24 hr tablet TAKE 1 TABLET BY MOUTH EVERY DAY 90 tablet 3    SUMAtriptan (Imitrex) 20 mg/actuation nasal spray once every 24 hours.      tacrolimus (Protopic) 0.03 % ointment Apply 1 Application topically.      valACYclovir (Valtrex) 500 mg tablet Take by mouth.            Inpatient Medications:  Scheduled medications   Medication Dose Route Frequency     PRN medications   Medication    fentaNYL PF    midazolam     Continuous Medications   Medication Dose Last Rate    sodium chloride 0.9%  100 mL/hr 100 mL/hr (05/10/24 0800)         Review of Systems   Constitutional:  Positive for fatigue.   HENT: Negative.     Eyes: Negative.    Respiratory:  Positive for shortness of breath (NIELSON).   "  Cardiovascular: Negative.    Gastrointestinal: Negative.    Endocrine: Negative.    Genitourinary: Negative.    Musculoskeletal: Negative.    Skin: Negative.    Allergic/Immunologic: Negative.    Neurological: Negative.    Hematological: Negative.    Psychiatric/Behavioral: Negative.            Physical Exam  General:  Patient is awake, alert, and oriented.  Patient is in no acute distress.  HEENT:  Pupils equal and reactive.  Normocephalic.  Moist mucosa.    Neck:  No JVD.   Cardiovascular:  Regular rate and rhythm.  Normal S1 and S2. No murmurs/rubs/gallops. Radial pulses 2+.   Pulmonary:  Clear to auscultation bilaterally.  Abdomen:  Soft. Non-tender.   Non-distended.  Positive bowel sounds.  Lower Extremities:  Pedal pulses 2+ No LE edema.  Neurologic:  Cranial nerves II-XII grossly intact.   No focal deficit.   Skin: Skin warm and dry, no lesions. Normal skin turgor.   Psychiatric: Normal affect.       Sedation Plan    ASA 2     Mallampati class: II.         Last Recorded Vitals  Blood pressure 128/76, pulse 86, temperature 36.9 °C (98.4 °F), temperature source Temporal, resp. rate 16, SpO2 99%.         Vitals from the Past 24 Hours  Heart Rate:  [78-86]   Temp:  [36.9 °C (98.4 °F)]   Resp:  [16]   BP: (128)/(76)   SpO2:  [97 %-99 %]          Relevant Results    Labs    CBC:   Recent Labs     04/23/24  0759   WBC 3.5*   HGB 13.6   HCT 40.8      MCV 93     BMP/CMP:   Recent Labs     04/23/24  0759      K 4.0      BUN 17   CREATININE 0.61   CO2 25   CALCIUM 8.7   GLUCOSE 107*      Lipid Panel: No results for input(s): \"CHOL\", \"HDL\", \"CHHDL\", \"LDL\", \"VLDL\", \"TRIG\", \"NHDL\" in the last 64444 hours.  Cardiac       No lab exists for component: \"CK\", \"CKMBP\"   Hemoglobin A1C: No results for input(s): \"HGBA1C\" in the last 00544 hours.  TSH/ Free T4: No results for input(s): \"TSH\", \"FREET4\" in the last 52910 hours.  Iron:   Recent Labs     12/29/23  1205   BNP 26     Coag:     ABO: No results found " "for: \"ABO\"    Past Cardiology Tests (Last 3 Years):    EKG:  No results for input(s): \"ATRRATE\", \"VENTRATE\", \"PRINT\", \"QRSDUR\", \"QTCFRED\", \"QTCCALCB\" in the last 90282 hours.No results found for this or any previous visit (from the past 4464 hour(s)).  Echo:  Echocardiogram:   Transthoracic Echo (TTE) Complete 12/28/2023    [facility] [place], [place] [address]  Tel [phone] and Fax [phone]    TRANSTHORACIC ECHOCARDIOGRAM REPORT      Patient Name:      ERNESTINAOSMIN An Physician:   Geo De La Cruz MD  Study Date:        12/28/2023          Ordering Provider:   Geo DE LA CRUZ  MRN/PID:           [id number]            Fellow:  Accession#:        [id number]        Nurse:  Date of Birth/Age: 1970 / 53 years Sonographer:         Candida HOOVER RDCS  Gender:            F                   Additional Staff:  Height:            162.56 cm           Admit Date:          12/28/2023  Weight:            71.67 kg            Admission Status:    Outpatient  BSA:               1.77 m2             Encounter#:          [id number]  Department Location: [facility]  Blood Pressure: 138 /81 mmHg    Study Type:    TRANSTHORACIC ECHO (TTE) COMPLETE  Diagnosis/ICD: Shortness of breath-R06.02; Nonrheumatic tricuspid (valve)  insufficiency-I36.1  Indication:    SOB/TR  CPT Code:      Echo Complete w Full Doppler-09821  Study Detail: The following Echo studies were performed: 2D, M-Mode, Doppler and  color flow. A bubble study was not performed.      PHYSICIAN INTERPRETATION:  Left Ventricle: The left ventricular systolic function is normal. There are no regional wall motion abnormalities. The left ventricular cavity size is normal. Spectral Doppler shows an impaired relaxation pattern of left ventricular diastolic filling.  Left Atrium: The left atrium is mildly dilated.  Right Ventricle: The right " ventricle is upper limits of normal in size. There is normal right ventricular global systolic function.  Right Atrium: The right atrium is normal in size.  Aortic Valve: The aortic valve appears structurally normal. There is no evidence of aortic valve regurgitation. The peak instantaneous gradient of the aortic valve is 8.1 mmHg.  Mitral Valve: The mitral valve is normal in structure. There is mild mitral valve regurgitation.  Tricuspid Valve: The tricuspid valve is structurally normal. There is mild tricuspid regurgitation.  Pulmonic Valve: The pulmonic valve is structurally normal. There is no indication of pulmonic valve regurgitation.  Pericardium: There is no pericardial effusion noted.  Aorta: The aortic root is normal.  In comparison to the previous echocardiogram(s): There are no prior studies on this patient for comparison purposes.      CONCLUSIONS:  1. Left ventricular systolic function is normal.  2. Spectral Doppler shows an impaired relaxation pattern of left ventricular diastolic filling.    QUANTITATIVE DATA SUMMARY:  2D MEASUREMENTS:  Normal Ranges:  LAs:           3.21 cm   (2.7-4.0cm)  RVIDd:         2.49 cm   (0.9-3.6cm)  IVSd:          1.12 cm   (0.6-1.1cm)  LVPWd:         0.90 cm   (0.6-1.1cm)  LVIDd:         4.61 cm   (3.9-5.9cm)  LVIDs:         2.70 cm  LV Mass Index: 91.4 g/m2  LV % FS        41.4 %    LA VOLUME:  Normal Ranges:  LA Vol A4C:        53.7 ml    (22+/-6mL/m2)  LA Vol A2C:        65.3 ml  LA Vol BP:         59.6 ml  LA Vol Index A4C:  30.4 ml/m2  LA Vol Index A2C:  36.9 ml/m2  LA Vol Index BP:   33.7 ml/m2  LA Area A4C:       18.2 cm2  LA Area A2C:       20.0 cm2  LA Major Axis A4C: 5.2 cm  LA Major Axis A2C: 5.2 cm  LA Vol A4C:        48.5 ml  LA Vol A2C:        63.4 ml    RA VOLUME BY A/L METHOD:  Normal Ranges:  RA Vol A4C:        38.0 ml    (8.3-19.5ml)  RA Vol Index A4C:  21.5 ml/m2  RA Area A4C:       14.9 cm2  RA Major Axis A4C: 5.0 cm    LV SYSTOLIC FUNCTION BY 2D  PLANIMETRY (MOD):  Normal Ranges:  EF-A4C View: 57.9 % (>=55%)  EF-A2C View: 61.5 %  EF-Biplane:  60.6 %    LV DIASTOLIC FUNCTION:  Normal Ranges:  MV Peak E:    0.62 m/s    (0.7-1.2 m/s)  MV Peak A:    0.70 m/s    (0.42-0.7 m/s)  E/A Ratio:    0.89        (1.0-2.2)  MV e'         0.09 m/s    (>8.0)  MV lateral e' 0.10 m/s  MV medial e'  0.08 m/s  MV A Dur:     116.08 msec  E/e' Ratio:   6.93        (<8.0)    MITRAL VALVE:  Normal Ranges:  MV DT: 213 msec (150-240msec)    AORTIC VALVE:  Normal Ranges:  AoV Vmax:      1.42 m/s (<=1.7m/s)  AoV Peak P.1 mmHg (<20mmHg)  LVOT Max Juan Pablo:  1.14 m/s (<=1.1m/s)  LVOT VTI:      23.96 cm  LVOT Diameter: 1.86 cm  (1.8-2.4cm)  AoV Area,Vmax: 2.19 cm2 (2.5-4.5cm2)      RIGHT VENTRICLE:  RV Basal 3.50 cm  RV Mid   2.49 cm  RV Major 6.1 cm  TAPSE:   26.9 mm  RV s'    0.14 m/s    TRICUSPID VALVE/RVSP:  Normal Ranges:  Peak TR Velocity: 2.40 m/s  Est. RA Pressure: 3 mmHg  RV Syst Pressure: 26.1 mmHg (< 30mmHg)  IVC Diam:         1.55 cm    PULMONIC VALVE:  Normal Ranges:  PV Max Juan Pablo: 1.0 m/s  (0.6-0.9m/s)  PV Max PG:  3.6 mmHg    AORTA:  Asc Ao Diam 3.12 cm      [id number] Farrukh Segundo MD  Electronically signed on 2023 at 11:37:29 AM        ** Final **    Ejection Fractions:  LV EF   Date/Time Value Ref Range Status   2023 08:05 AM 61       Cath:  Coronary Angiography: No results found for this or any previous visit from the past 1800 days.    Right Heart Cath: No results found for this or any previous visit from the past 1800 days.    Stress Test:  Nuclear:No results found for this or any previous visit from the past 1800 days.    Metabolic Stress:   Cardiopulmonary (Metabolic) Stress Test 2024    [place] [address]  Phone [phone] Fax [phone]    Cardiopulmonary Exercise Report - (Non-Invasive)      Patient Name:      ERNESTINA An Physician:   [id number] Rob Paz MD  Study  Date:        2024           Ordering Provider:   [id number] JOSÉ MIGUEL TATE  MRN/PID:           [id number]            Fellow:  Accession#:        [id number]        Nurse:               Roberta Chavez RN  Date of Birth/Age: 1970 / 53 years Exercise  Physiologist:  Gender:            F                   Additional Staff:  Height:            162.56              Admit Date:  Weight:            72.58               Admission Status:  BSA:               1.78 m2             Encounter#:          [id number]  BMI:               27.46 kg/m2    Study Type: CARDIOPULMONARY (METABOLIC) STRESS TEST    Diagnosis/ICD: Shortness of breath-R06.02  Indication:    dyspnea  CPT Codes:      Pulmonary Function Testing:    Spirometry:  FEV1 (L) 2.73  FVC (L)  3.09  FEV1/FVC 88 %      Maximum Voluntary Ventilation (est): 109 Liters    EXERCISE PROTOCOL  Protocol: BIKE15 WATT RAMP Test Duration: 7:28      Measurements:  gas analysis by metabolic cart. ECG continuous recording. Serial blood pressure measurements by manual cuff inflation. Continuous oxygen saturation by finger pulse oximeter.    Reason for Stopping: Leg fatigue.    (NON-INVASIVE)  +-------------------+----+-------------+----------+                     RestPeak Exercise% Achieved  +-------------------+----+-------------+----------+  Work ([place])           83                       +-------------------+----+-------------+----------+       HR (BPM)       85      124         75      +-------------------+----+-------------+----------+  Systolic BP        118 112                      +-------------------+----+-------------+----------+  Diastolic BP       56  64                       +-------------------+----+-------------+----------+  MAP (mmHg)         77  80                       +-------------------+----+-------------+----------+  VO2 (ml/Kg/min)    5.1 14.1         68          +-------------------+----+-------------+----------+  VO2  (ml/min)       372 1021         68          +-------------------+----+-------------+----------+  O2 Pulse (ml/beat) 4   8                        +-------------------+----+-------------+----------+  VE (L/min)         11  33.70        34          +-------------------+----+-------------+----------+  VE/VCO2            34  27           70          +-------------------+----+-------------+----------+  Resp. Rate (br/min)21  32                       +-------------------+----+-------------+----------+  RER                0.911.25                     +-------------------+----+-------------+----------+  SaO2%              94  96                       +-------------------+----+-------------+----------+      Patient Performance:    Cardiac Response:  The patient developed no symptoms during the stress exam. The peak heart rate achieved was 124 bpm, which was 75 % of the age predicted target heart rate of 165 bpm. There is a good exercise effort as reflected in the peak exercise RER. Appropriate blood pressure response to exercise. Heart rate reserve:41 bpm. The O2 pulse (VO2/HR) was 4 ml/beat at rest and increased to 8 ml/beat at peak exercise. Oxygen saturation: stable. Subjective assessment of effort: good.    EKG:  Resting ECG showed normal sinus rhythm with normal tracing, rare premature ventricular contractions and rare premature atrial contractions. Stress ECG showed sinus tachycardia, with no abnormal findings.    Gas Exchange:  The peak VO2 achieved is 14.1 ml/Kg/min, which is 68% of the predicted maximum. Soares functional class C (moderate to severe impairment). A ventilatory threshold of 14.1 occurred at 68% of peak VO2. The perceived maximal exertion by RPE scale was 20--Maximal exertion. The dyspnea rating at peak exercise was 2--Slight. The patient's oxygen saturation was 94 at rest and 96 at peak exercise.    Ventilatory Response:  The ventilatory efficiency slope at peak  exercise was 27. The patient's resting minute ventilation (VE) was 11 liters/minute and increased to 33.70 liters/minute at peak exercise which is 34% of predicted. Ventilatory anaerobic threshold 9 (reached at 64% of predicted). The respiratory rate at rest was 21 breaths/minute and increased to 32 breaths/minute at peak exercise. Ventilatory reserve (VE max/MVV): 0.28.      Impression:    1. Abnormal cardiopulmonary exercise test.  2. Soares functional class C (moderate to severe impairment).  3. Probable cause of functional impairment: Cardiac.  4. RER > 1 indicates good effort.  5. Peak VO2 of 14.1 mL/kg/min and/or VE/VC02 of 27 indicates moderate risk of complications.  6. Repeat testing in future as clinically indicated.    [id number] Rob Paz MD  Electronically signed on 4/19/2024 at 11:57:12 AM          ** Final **    Cardiac Imaging:  Cardiac Scoring: No results found for this or any previous visit from the past 1800 days.    Cardiac MRI: No results found for this or any previous visit from the past 1800 days.         Assessment/Plan  Assessment/Plan   Principal Problem:    Shortness of breath        #SOB  -RHC/LHC with Dr. Segundo today 5/10/24   - mg PO pre-procedure     I spent 30 minutes in the professional and overall care of this patient.      Nelli Brewer, JACKELYN-CNP

## 2024-06-24 ENCOUNTER — OFFICE VISIT (OUTPATIENT)
Dept: CARDIOLOGY | Facility: CLINIC | Age: 54
End: 2024-06-24
Payer: COMMERCIAL

## 2024-06-24 ENCOUNTER — HOSPITAL ENCOUNTER (OUTPATIENT)
Dept: CARDIOLOGY | Facility: CLINIC | Age: 54
Discharge: HOME | End: 2024-06-24
Payer: COMMERCIAL

## 2024-06-24 VITALS
TEMPERATURE: 97.6 F | HEIGHT: 64 IN | BODY MASS INDEX: 27.83 KG/M2 | WEIGHT: 163 LBS | SYSTOLIC BLOOD PRESSURE: 125 MMHG | DIASTOLIC BLOOD PRESSURE: 73 MMHG | HEART RATE: 75 BPM

## 2024-06-24 DIAGNOSIS — I07.1 TRICUSPID VALVE INSUFFICIENCY, UNSPECIFIED ETIOLOGY: ICD-10-CM

## 2024-06-24 DIAGNOSIS — I36.1 NONRHEUMATIC TRICUSPID (VALVE) INSUFFICIENCY: ICD-10-CM

## 2024-06-24 DIAGNOSIS — I36.1 NONRHEUMATIC TRICUSPID VALVE REGURGITATION: Primary | ICD-10-CM

## 2024-06-24 DIAGNOSIS — I49.1 PAC (PREMATURE ATRIAL CONTRACTION): ICD-10-CM

## 2024-06-24 LAB
AORTIC VALVE PEAK VELOCITY: 1.38 M/S
AV PEAK GRADIENT: 7.6 MMHG
AVA (PEAK VEL): 2.41 CM2
EJECTION FRACTION APICAL 4 CHAMBER: 60.6
EJECTION FRACTION: 57 %
LEFT ATRIUM VOLUME AREA LENGTH INDEX BSA: 31 ML/M2
LEFT VENTRICLE INTERNAL DIMENSION DIASTOLE: 4.64 CM (ref 3.5–6)
LEFT VENTRICULAR OUTFLOW TRACT DIAMETER: 1.97 CM
MITRAL VALVE E/A RATIO: 0.93
MITRAL VALVE E/E' RATIO: 8.3
RIGHT VENTRICLE FREE WALL PEAK S': 15 CM/S
RIGHT VENTRICLE PEAK SYSTOLIC PRESSURE: 28.3 MMHG
TRICUSPID ANNULAR PLANE SYSTOLIC EXCURSION: 2.2 CM

## 2024-06-24 PROCEDURE — 1036F TOBACCO NON-USER: CPT | Performed by: INTERNAL MEDICINE

## 2024-06-24 PROCEDURE — 93306 TTE W/DOPPLER COMPLETE: CPT

## 2024-06-24 PROCEDURE — 93306 TTE W/DOPPLER COMPLETE: CPT | Performed by: INTERNAL MEDICINE

## 2024-06-24 PROCEDURE — 99214 OFFICE O/P EST MOD 30 MIN: CPT | Performed by: INTERNAL MEDICINE

## 2024-06-24 PROCEDURE — 93005 ELECTROCARDIOGRAM TRACING: CPT | Performed by: INTERNAL MEDICINE

## 2024-06-24 RX ORDER — FLECAINIDE ACETATE 50 MG/1
50 TABLET ORAL 2 TIMES DAILY
Qty: 60 TABLET | Refills: 11 | Status: SHIPPED | OUTPATIENT
Start: 2024-06-24 | End: 2025-06-24

## 2024-06-24 NOTE — PROGRESS NOTES
"Chief Complaint:   TR     History of Present Illness     Antonia Reeves is a 54 y.o. female presenting with PAC's. Still feeling palpitations (skipped beats every day) no better with Toprol XL.  NIELSON has improved - still has difficulty laying on back. No syncope or near-syncope. 1 cup coffee.  Exercising.  Still with edema of hands and legs.       Previous History     Past Medical History:  She has a past medical history of Nonrheumatic tricuspid valve regurgitation (2023) and PAC (premature atrial contraction) (2023).    Past Surgical History:  She has a past surgical history that includes Appendectomy;  section, classic; and Cardiac catheterization (N/A, 5/10/2024).      Social History:  She reports that she has quit smoking. Her smoking use included cigarettes. She has a 2.5 pack-year smoking history. She has never used smokeless tobacco. She reports current alcohol use. She reports that she does not use drugs.    Family History:  Family History   Problem Relation Name Age of Onset    Hip fracture Mother      Other (gunshot) Father          Allergies:  Patient has no known allergies.    Outpatient Medications:  Current Outpatient Medications   Medication Instructions    albuterol (ProAir HFA) 90 mcg/actuation inhaler 2 puffs, inhalation, Every 4 hours PRN    metoprolol succinate XL (TOPROL-XL) 25 mg, oral, Daily    SUMAtriptan (Imitrex) 20 mg/actuation nasal spray Every 24 hours    tacrolimus (Protopic) 0.03 % ointment 1 Application, Topical       Physical Examination   Vitals:  Visit Vitals  /73 (BP Location: Right arm)   Pulse 75   Temp 36.4 °C (97.6 °F)   Ht 1.626 m (5' 4\")   Wt 73.9 kg (163 lb)   BMI 27.98 kg/m²   OB Status Postmenopausal   Smoking Status Former   BSA 1.83 m²      Physical Exam  Vitals reviewed.   Constitutional:       General: She is not in acute distress.     Appearance: Normal appearance.   HENT:      Head: Normocephalic and atraumatic.      Nose: Nose normal. " "  Eyes:      Conjunctiva/sclera: Conjunctivae normal.   Cardiovascular:      Rate and Rhythm: Normal rate and regular rhythm.      Pulses: Normal pulses.      Heart sounds: No murmur heard.  Pulmonary:      Effort: Pulmonary effort is normal. No respiratory distress.      Breath sounds: Normal breath sounds. No wheezing, rhonchi or rales.   Abdominal:      General: Bowel sounds are normal. There is no distension.      Palpations: Abdomen is soft.      Tenderness: There is no abdominal tenderness.   Musculoskeletal:         General: No swelling.      Right lower leg: No edema.      Left lower leg: No edema.   Skin:     General: Skin is warm and dry.      Capillary Refill: Capillary refill takes less than 2 seconds.   Neurological:      General: No focal deficit present.      Mental Status: She is alert.   Psychiatric:         Mood and Affect: Mood normal.           Labs/Imaging/Cardiac Studies     Last Labs:  CBC -  Lab Results   Component Value Date    WBC 3.5 (L) 04/23/2024    HGB 13.6 04/23/2024    HCT 40.8 04/23/2024    MCV 93 04/23/2024     04/23/2024       CMP -  Lab Results   Component Value Date    CALCIUM 8.7 04/23/2024       LIPID PANEL -   No results found for: \"CHOL\", \"HDL\", \"CHHDL\", \"LDL\", \"VLDL\", \"TRIG\", \"NHDL\"    RENAL FUNCTION PANEL -   Lab Results   Component Value Date    K 4.0 04/23/2024       Lab Results   Component Value Date    BNP 26 12/29/2023       ECG:    Echo:  Transthoracic echo (TTE) complete    Result Date: 12/28/2023        [facility] [place] [address]        Tel [phone] and Fax [phone] TRANSTHORACIC ECHOCARDIOGRAM REPORT  Patient Name:      ERNESTINA An Physician:   Geo De La Cruz MD Study Date:        12/28/2023          Ordering Provider:   Geo DE LA CRUZ MRN/PID:           [id number]            Fellow: Accession#:        [id number]    "     Nurse: Date of Birth/Age: 1970 / 53 years Sonographer:         Candida Hearn ACS,                                                             RDCS Gender:            F                   Additional Staff: Height:            162.56 cm           Admit Date:          12/28/2023 Weight:            71.67 kg            Admission Status:    Outpatient BSA:               1.77 m2             Encounter#:          [id number]                                        Department Location: [facility] Blood Pressure: 138 /81 mmHg Study Type:    TRANSTHORACIC ECHO (TTE) COMPLETE Diagnosis/ICD: Shortness of breath-R06.02; Nonrheumatic tricuspid (valve)                insufficiency-I36.1 Indication:    SOB/TR CPT Code:      Echo Complete w Full Doppler-03616  Study Detail: The following Echo studies were performed: 2D, M-Mode, Doppler and               color flow. A bubble study was not performed.  PHYSICIAN INTERPRETATION: Left Ventricle: The left ventricular systolic function is normal. There are no regional wall motion abnormalities. The left ventricular cavity size is normal. Spectral Doppler shows an impaired relaxation pattern of left ventricular diastolic filling. Left Atrium: The left atrium is mildly dilated. Right Ventricle: The right ventricle is upper limits of normal in size. There is normal right ventricular global systolic function. Right Atrium: The right atrium is normal in size. Aortic Valve: The aortic valve appears structurally normal. There is no evidence of aortic valve regurgitation. The peak instantaneous gradient of the aortic valve is 8.1 mmHg. Mitral Valve: The mitral valve is normal in structure. There is mild mitral valve regurgitation. Tricuspid Valve: The tricuspid valve is structurally normal. There is mild tricuspid regurgitation. Pulmonic Valve: The pulmonic valve is structurally normal. There is no indication of pulmonic valve regurgitation. Pericardium: There is no pericardial effusion noted.  Aorta: The aortic root is normal. In comparison to the previous echocardiogram(s): There are no prior studies on this patient for comparison purposes.  CONCLUSIONS:  1. Left ventricular systolic function is normal.  2. Spectral Doppler shows an impaired relaxation pattern of left ventricular diastolic filling. QUANTITATIVE DATA SUMMARY: 2D MEASUREMENTS:                          Normal Ranges: LAs:           3.21 cm   (2.7-4.0cm) RVIDd:         2.49 cm   (0.9-3.6cm) IVSd:          1.12 cm   (0.6-1.1cm) LVPWd:         0.90 cm   (0.6-1.1cm) LVIDd:         4.61 cm   (3.9-5.9cm) LVIDs:         2.70 cm LV Mass Index: 91.4 g/m2 LV % FS        41.4 % LA VOLUME:                               Normal Ranges: LA Vol A4C:        53.7 ml    (22+/-6mL/m2) LA Vol A2C:        65.3 ml LA Vol BP:         59.6 ml LA Vol Index A4C:  30.4 ml/m2 LA Vol Index A2C:  36.9 ml/m2 LA Vol Index BP:   33.7 ml/m2 LA Area A4C:       18.2 cm2 LA Area A2C:       20.0 cm2 LA Major Axis A4C: 5.2 cm LA Major Axis A2C: 5.2 cm LA Vol A4C:        48.5 ml LA Vol A2C:        63.4 ml RA VOLUME BY A/L METHOD:                               Normal Ranges: RA Vol A4C:        38.0 ml    (8.3-19.5ml) RA Vol Index A4C:  21.5 ml/m2 RA Area A4C:       14.9 cm2 RA Major Axis A4C: 5.0 cm LV SYSTOLIC FUNCTION BY 2D PLANIMETRY (MOD):                     Normal Ranges: EF-A4C View: 57.9 % (>=55%) EF-A2C View: 61.5 % EF-Biplane:  60.6 % LV DIASTOLIC FUNCTION:                           Normal Ranges: MV Peak E:    0.62 m/s    (0.7-1.2 m/s) MV Peak A:    0.70 m/s    (0.42-0.7 m/s) E/A Ratio:    0.89        (1.0-2.2) MV e'         0.09 m/s    (>8.0) MV lateral e' 0.10 m/s MV medial e'  0.08 m/s MV A Dur:     116.08 msec E/e' Ratio:   6.93        (<8.0) MITRAL VALVE:                 Normal Ranges: MV DT: 213 msec (150-240msec) AORTIC VALVE:                         Normal Ranges: AoV Vmax:      1.42 m/s (<=1.7m/s) AoV Peak P.1 mmHg (<20mmHg) LVOT Max Juan Pablo:  1.14 m/s  (<=1.1m/s) LVOT VTI:      23.96 cm LVOT Diameter: 1.86 cm  (1.8-2.4cm) AoV Area,Vmax: 2.19 cm2 (2.5-4.5cm2)  RIGHT VENTRICLE: RV Basal 3.50 cm RV Mid   2.49 cm RV Major 6.1 cm TAPSE:   26.9 mm RV s'    0.14 m/s TRICUSPID VALVE/RVSP:                             Normal Ranges: Peak TR Velocity: 2.40 m/s Est. RA Pressure: 3 mmHg RV Syst Pressure: 26.1 mmHg (< 30mmHg) IVC Diam:         1.55 cm PULMONIC VALVE:                      Normal Ranges: PV Max Juan Pablo: 1.0 m/s  (0.6-0.9m/s) PV Max PG:  3.6 mmHg AORTA: Asc Ao Diam 3.12 cm  [id number] Farrukh Segundo MD Electronically signed on 12/28/2023 at 11:37:29 AM  ** Final **         Assessment and Recommendations     Assessment/Plan     1. Nonrheumatic tricuspid valve regurgitation  She has only mild TR and L/RHC has ruled out HF or PAH. Follow with pulmonary.    2. PAC (premature atrial contraction)  Stop metoprolol and start Flecainide.     3. NIELSON:  Not cardiac.  Follow with pulmonary.      Farrukh Segundo MD    Exclusive of any other services or procedures performed, I, Farrukh Segundo MD , spent 30 minutes in duration for this visit today.  This time consisted of chart review, obtaining history, and/or performing the exam as documented above as well as documenting the clinical information for the encounter in the electronic record, discussing treatment options, plans, and/or goals with patient, family, and/or caregiver, refilling medications, updating the electronic record, ordering medicines, lab work, imaging, referrals, and/or procedures as documented above and communicating with other [facility] professionals. I have discussed the results of laboratory, radiology, and cardiology studies with the patient and their family/caregiver.

## 2024-07-01 ENCOUNTER — APPOINTMENT (OUTPATIENT)
Dept: CARDIOLOGY | Facility: CLINIC | Age: 54
End: 2024-07-01
Payer: COMMERCIAL

## 2024-07-01 LAB
ATRIAL RATE: 74 BPM
P AXIS: 59 DEGREES
P OFFSET: 197 MS
P ONSET: 136 MS
PR INTERVAL: 164 MS
Q ONSET: 218 MS
QRS COUNT: 12 BEATS
QRS DURATION: 96 MS
QT INTERVAL: 422 MS
QTC CALCULATION(BAZETT): 468 MS
QTC FREDERICIA: 452 MS
R AXIS: 49 DEGREES
T AXIS: 39 DEGREES
T OFFSET: 429 MS
VENTRICULAR RATE: 74 BPM

## 2024-07-03 ENCOUNTER — OFFICE VISIT (OUTPATIENT)
Dept: PULMONOLOGY | Facility: CLINIC | Age: 54
End: 2024-07-03
Payer: COMMERCIAL

## 2024-07-03 VITALS
SYSTOLIC BLOOD PRESSURE: 104 MMHG | DIASTOLIC BLOOD PRESSURE: 65 MMHG | WEIGHT: 162 LBS | OXYGEN SATURATION: 99 % | HEIGHT: 64 IN | TEMPERATURE: 97.8 F | HEART RATE: 77 BPM | BODY MASS INDEX: 27.66 KG/M2 | RESPIRATION RATE: 18 BRPM

## 2024-07-03 DIAGNOSIS — R06.02 SHORTNESS OF BREATH: ICD-10-CM

## 2024-07-03 DIAGNOSIS — R29.818 SUSPECTED SLEEP APNEA: Primary | ICD-10-CM

## 2024-07-03 PROCEDURE — 99213 OFFICE O/P EST LOW 20 MIN: CPT | Performed by: STUDENT IN AN ORGANIZED HEALTH CARE EDUCATION/TRAINING PROGRAM

## 2024-07-03 ASSESSMENT — ENCOUNTER SYMPTOMS
UNEXPECTED WEIGHT CHANGE: 0
FEVER: 0
ABDOMINAL PAIN: 0
CHILLS: 0
PALPITATIONS: 1
ARTHRALGIAS: 0
ABDOMINAL DISTENTION: 0

## 2024-07-03 ASSESSMENT — PAIN SCALES - GENERAL: PAINLEVEL: 0-NO PAIN

## 2024-07-03 NOTE — PATIENT INSTRUCTIONS
Thank you for visiting the Pulmonary Clinic today.   Your breathing medications: albuterol prior to exercise   Tests: home sleep study   Referrals: follow up regarding pulmonary rehab   Return in 4-5 months   If you have questions or concerns, call [phone] (option 4)

## 2024-07-03 NOTE — PROGRESS NOTES
Department of Medicine I Division of Pulmonary, Critical Care, and Sleep Medicine   [address] [place] [address]  Phone: [phone]  Fax: [phone]    History of Present Illness   Antonia Reeves is a 54 y.o. female presenting with dyspnea    7/3/2024   Since the last visit   She had CPET done with spirometry at peak exercise--no obstruction noted, there was suggestion of perhaps cardiac limitation--underwent LHC and RHC which were essentially normal (PW was borderline). Also had repeat echo done which suggested impaired relaxation of diastolic filling  She is here for follow up   Symptoms: Started to exercise more which was improving symptoms   Stopped metoprolol -->switched to flecainide for her PAC  ?Pulmonary rehab--has not enrolled just yet   Does endorse some daytime fatigue and orthopnea   Swelling is improved   Pulmonary medications: albuterol prn     4/01/2024  Referred by:  Dr. Segundo for dyspnea. I have independently interviewed and examined the patient in the office and reviewed available records.    Follows with Dr. Segundo for TR, PAC's, tentative plan for LHC/RHC     Previously seen by Dr. Choudhury in April 2023 for similar symptoms--workup at the time included PFT (which was normal) and walk test (which was normal)--echo with moderate TR at the time, repeat demonstrates mild TR    Endorses a history of asthma many years ago--not currently on inhalers     Did have covid infection in August 2021   Did notice after covid infection having more frequent PAC and trouble with her breathing. Feels like she has not gotten back to baseline     Does endorse swelling in her hands and feet   Has also endorsed a rapid weight gain after menopause     Dyspnea happens with exertion---ie with stairs, also when her heart races, orthopnea; denies PND  Denies any cough   Occasional post nasal drip   Denies any rash   Pulmonary medications: none       Review of Systems  Review of Systems    Constitutional:  Negative for chills, fever and unexpected weight change.   Respiratory:          As per hpi   Cardiovascular:  Positive for palpitations. Negative for chest pain and leg swelling.   Gastrointestinal:  Negative for abdominal distention and abdominal pain.   Musculoskeletal:  Negative for arthralgias and gait problem.   Skin:  Negative for rash.     All other review of systems are negative and/or non-contributory.    Past Medical History   She has a past medical history of Nonrheumatic tricuspid valve regurgitation (2023) and PAC (premature atrial contraction) (2023).    Immunizations     Immunization History   Administered Date(s) Administered    Pfizer Purple Cap SARS-CoV-2 2021, 2021       Medications and Allergies     Current Outpatient Medications   Medication Instructions    albuterol (ProAir HFA) 90 mcg/actuation inhaler 2 puffs, inhalation, Every 4 hours PRN    flecainide (TAMBOCOR) 50 mg, oral, 2 times daily    SUMAtriptan (Imitrex) 20 mg/actuation nasal spray Every 24 hours      Patient has no known allergies.    Social History   She reports that she has quit smoking. Her smoking use included cigarettes. She has a 2.5 pack-year smoking history. She has never used smokeless tobacco. She reports current alcohol use. She reports that she does not use drugs.    Smoking History: 10 year smoking history (quit 25 years ago). No vaping. Denies any methamphetamine use.  Remote marijuana and cocaine use. 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  Has one dog at home and two cats       Family History   Mom:  bullous pemphigoid (was on infusions)   Dad: passed when she was young (not sure of any medical conditions)         Surgical History   She has a past surgical history that includes Appendectomy;  section, classic; and Cardiac catheterization (N/A, 5/10/2024).    Physical Exam     Vitals:    24 0852    BP: 104/65   Pulse: 77   Resp: 18   Temp: 36.6 °C (97.8 °F)   SpO2: 99%          Physical Exam  Vitals reviewed.   Constitutional:       General: She is awake.   Cardiovascular:      Rate and Rhythm: Normal rate and regular rhythm.   Pulmonary:      Effort: Pulmonary effort is normal.      Breath sounds: Normal breath sounds.   Neurological:      Mental Status: She is alert and oriented to person, place, and time.   Psychiatric:         Attention and Perception: Attention and perception normal.         Behavior: Behavior normal.          Results   Pulmonary Function Tests:  3/24/2023  FEV1/FVC: 77 FEV1: 105% predicted  T% RV/T% pred   DLCO: 115% predicted     Chest Radiograph:  CHEST 2 VIEW     Narrative  Interpreted By:  CHRISTIANO HOLT MD  MRN: [id number]  Patient Name: ERNESTINA RIZVI    STUDY:   CHEST 2 VIEW PA AND LAT;  2023 10:03 am    INDICATION:  DCH02.    COMPARISON:  None.    ACCESSION NUMBER(S):  [id number]    ORDERING CLINICIAN:  LISA CARLOS    FINDINGS:  CARDIOMEDIASTINAL SILHOUETTE AND VASCULATURE:    Cardiac size:  Within normal limits considering technique    Aortic shadow:  Within normal limits considering technique    Mediastinal contours: Within normal limits considering technique    Pulmonary vasculature:  The central vasculature is unremarkable    LUNGS:  Lungs are clear.    ABDOMEN AND OTHER FINDINGS:  No remarkable upper abdominal findings.    BONES:  No acute osseous changes.    Impression  1.  No active cardiopulmonary disease.      Chest CT Scan:  No results found for this or any previous visit from the past 365 days.       ECHO:  2023  PHYSICIAN INTERPRETATION:  Left Ventricle: The left ventricular systolic function is normal. There are no regional wall motion abnormalities. The left ventricular cavity size is normal. Spectral Doppler shows an impaired relaxation pattern of left ventricular diastolic filling.  Left Atrium: The left atrium is mildly dilated.  Right  Ventricle: The right ventricle is upper limits of normal in size. There is normal right ventricular global systolic function.  Right Atrium: The right atrium is normal in size.  Aortic Valve: The aortic valve appears structurally normal. There is no evidence of aortic valve regurgitation. The peak instantaneous gradient of the aortic valve is 8.1 mmHg.  Mitral Valve: The mitral valve is normal in structure. There is mild mitral valve regurgitation.  Tricuspid Valve: The tricuspid valve is structurally normal. There is mild tricuspid regurgitation.  Pulmonic Valve: The pulmonic valve is structurally normal. There is no indication of pulmonic valve regurgitation.  Pericardium: There is no pericardial effusion noted.  Aorta: The aortic root is normal.  In comparison to the previous echocardiogram(s): There are no prior studies on this patient for comparison purposes.        CONCLUSIONS:   1. Left ventricular systolic function is normal.   2. Spectral Doppler shows an impaired relaxation pattern of left ventricular diastolic filling.     CPET 4/18/2024  Patient Performance:     Cardiac Response:  The patient developed no symptoms during the stress exam. The peak heart rate achieved was 124 bpm, which was 75 % of the age predicted target heart rate of 165 bpm. There is a good exercise effort as reflected in the peak exercise RER. Appropriate blood pressure response to exercise. Heart rate reserve:41 bpm. The O2 pulse (VO2/HR) was 4 ml/beat at rest and increased to 8 ml/beat at peak exercise. Oxygen saturation: stable. Subjective assessment of effort: good.     EKG:  Resting ECG showed normal sinus rhythm with normal tracing, rare premature ventricular contractions and rare premature atrial contractions. Stress ECG showed sinus tachycardia, with no abnormal findings.     Gas Exchange:  The peak VO2 achieved is 14.1 ml/Kg/min, which is 68% of the predicted maximum. Soares functional class C (moderate to severe impairment).  "A ventilatory threshold of 14.1 occurred at 68% of peak VO2. The perceived maximal exertion by RPE scale was 20--Maximal exertion. The dyspnea rating at peak exercise was 2--Slight. The patient's oxygen saturation was 94 at rest and 96 at peak exercise.     Ventilatory Response:  The ventilatory efficiency slope at peak exercise was 27. The patient's resting minute ventilation (VE) was 11 liters/minute and increased to 33.70 liters/minute at peak exercise which is 34% of predicted. Ventilatory anaerobic threshold 9 (reached at 64% of predicted). The respiratory rate at rest was 21 breaths/minute and increased to 32 breaths/minute at peak exercise. Ventilatory reserve (VE max/MVV): 0.28.        Impression:      1. Abnormal cardiopulmonary exercise test.   2. Soares functional class C (moderate to severe impairment).   3. Probable cause of functional impairment: Cardiac.   4. RER > 1 indicates good effort.   5. Peak VO2 of 14.1 mL/kg/min and/or VE/VC02 of 27 indicates moderate risk of complications.   6. Repeat testing in future as clinically indicated.       RHC  5/10/2024   PW 15   PA: 29/15  meanPAP 21   CO: 5.5  CI: 3.1   Labs:  Lab Results   Component Value Date    WBC 3.5 (L) 04/23/2024    HGB 13.6 04/23/2024    HCT 40.8 04/23/2024    MCV 93 04/23/2024     04/23/2024       Lab Results   Component Value Date    CREATININE 0.61 04/23/2024    BUN 17 04/23/2024     04/23/2024    K 4.0 04/23/2024     04/23/2024    CO2 25 04/23/2024        No results found for: \"ZORAN\", \"RF\", \"SEDRATE\"     IgE: No results found for: \"IGE\"   Respiratory Allergy Panel:  AEC:   Eosinophils Absolute (x10*3/uL)   Date Value   04/23/2024 0.06     Eosinophils % (%)   Date Value   04/23/2024 1.7       Cultures:  No results found for: \"AFBCX\"   No results found for: \"RESPCULTCYFI\"    No results found for the last 90 days.  C     Assessment and Plan       Antonia A Dimuzio is a 54 y.o. year old female patient with  with prior " history of asthma, PAC, Tricuspid regurgitation and persistent dyspnea following covid infection in August 2021--mostly with activity.  PFTs and walk test are unremarkable.  She is following with cardiology and is on metoprolol for her PAC,  CPET, LHC/RHC are unrevealing, pt does report some improved symptoms-trying to be more active     Recommendations   Albuterol inh prior to activity   HSAT to investigate for KAYLA   Follow up pulmonary rehab     RTC in 4-5 months           Follow-up:  3 months or sooner     Courtney Temple MD  07/03/2024

## 2024-07-11 ENCOUNTER — TELEPHONE (OUTPATIENT)
Dept: CARDIOLOGY | Facility: CLINIC | Age: 54
End: 2024-07-11
Payer: COMMERCIAL

## 2024-07-21 ENCOUNTER — PATIENT MESSAGE (OUTPATIENT)
Dept: CARDIOLOGY | Facility: CLINIC | Age: 54
End: 2024-07-21
Payer: COMMERCIAL

## 2024-07-21 DIAGNOSIS — I49.1 PAC (PREMATURE ATRIAL CONTRACTION): Primary | ICD-10-CM

## 2024-07-21 DIAGNOSIS — I36.1 NONRHEUMATIC TRICUSPID VALVE REGURGITATION: ICD-10-CM

## 2024-07-22 RX ORDER — FLECAINIDE ACETATE 50 MG/1
50 TABLET ORAL 2 TIMES DAILY
Qty: 180 TABLET | Refills: 3 | Status: SHIPPED | OUTPATIENT
Start: 2024-07-22 | End: 2025-07-22

## 2024-11-06 ENCOUNTER — APPOINTMENT (OUTPATIENT)
Dept: PULMONOLOGY | Facility: CLINIC | Age: 54
End: 2024-11-06
Payer: COMMERCIAL

## 2025-01-09 ENCOUNTER — HOSPITAL ENCOUNTER (OUTPATIENT)
Dept: CARDIOLOGY | Facility: CLINIC | Age: 55
Discharge: HOME | End: 2025-01-09
Payer: COMMERCIAL

## 2025-01-09 ENCOUNTER — OFFICE VISIT (OUTPATIENT)
Dept: CARDIOLOGY | Facility: CLINIC | Age: 55
End: 2025-01-09
Payer: COMMERCIAL

## 2025-01-09 VITALS
BODY MASS INDEX: 28.32 KG/M2 | WEIGHT: 165 LBS | SYSTOLIC BLOOD PRESSURE: 119 MMHG | DIASTOLIC BLOOD PRESSURE: 72 MMHG | HEART RATE: 88 BPM

## 2025-01-09 DIAGNOSIS — I49.1 PAC (PREMATURE ATRIAL CONTRACTION): ICD-10-CM

## 2025-01-09 DIAGNOSIS — I49.1 PAC (PREMATURE ATRIAL CONTRACTION): Primary | ICD-10-CM

## 2025-01-09 LAB
ATRIAL RATE: 75 BPM
P AXIS: 70 DEGREES
P OFFSET: 205 MS
P ONSET: 152 MS
PR INTERVAL: 142 MS
Q ONSET: 223 MS
QRS COUNT: 12 BEATS
QRS DURATION: 94 MS
QT INTERVAL: 396 MS
QTC CALCULATION(BAZETT): 442 MS
QTC FREDERICIA: 426 MS
R AXIS: 72 DEGREES
T AXIS: 61 DEGREES
T OFFSET: 421 MS
VENTRICULAR RATE: 75 BPM

## 2025-01-09 PROCEDURE — 1036F TOBACCO NON-USER: CPT | Performed by: INTERNAL MEDICINE

## 2025-01-09 PROCEDURE — 93005 ELECTROCARDIOGRAM TRACING: CPT | Performed by: INTERNAL MEDICINE

## 2025-01-09 PROCEDURE — 99214 OFFICE O/P EST MOD 30 MIN: CPT | Performed by: INTERNAL MEDICINE

## 2025-01-09 PROCEDURE — 93242 EXT ECG>48HR<7D RECORDING: CPT

## 2025-01-09 NOTE — PROGRESS NOTES
Chief Complaint:   Palpitations     History of Present Illness     Antonia Reeves is a 54 y.o. female presenting with PAC's on Flecainide since 24.  Excellent initial response but over last 2 months frequent racing and skipping. No CP, NIELSON or syncope.       Previous History     Past Medical History:  She has a past medical history of Nonrheumatic tricuspid valve regurgitation (2023) and PAC (premature atrial contraction) (2023).    Past Surgical History:  She has a past surgical history that includes Appendectomy;  section, classic; and Cardiac catheterization (N/A, 5/10/2024).      Social History:  She reports that she has quit smoking. Her smoking use included cigarettes. She has a 2.5 pack-year smoking history. She has never used smokeless tobacco. She reports current alcohol use. She reports that she does not use drugs.    Family History:  Family History   Problem Relation Name Age of Onset    Hip fracture Mother      Other (gunshot) Father          Allergies:  Patient has no known allergies.    Outpatient Medications:  Current Outpatient Medications   Medication Instructions    albuterol (ProAir HFA) 90 mcg/actuation inhaler 2 puffs, inhalation, Every 4 hours PRN    flecainide (TAMBOCOR) 50 mg, oral, 2 times daily    SUMAtriptan (Imitrex) 20 mg/actuation nasal spray Every 24 hours       Physical Examination   Vitals:  Visit Vitals  /72 (BP Location: Right arm, Patient Position: Sitting, BP Cuff Size: Adult)   Pulse 88   Wt 74.8 kg (165 lb)   BMI 28.32 kg/m²   OB Status Postmenopausal   Smoking Status Former   BSA 1.84 m²    Physical Exam  Vitals reviewed.   Constitutional:       General: She is not in acute distress.     Appearance: Normal appearance.   HENT:      Head: Normocephalic and atraumatic.      Nose: Nose normal.   Eyes:      Conjunctiva/sclera: Conjunctivae normal.   Cardiovascular:      Rate and Rhythm: Normal rate and regular rhythm.      Pulses: Normal pulses.       Heart sounds: No murmur heard.  Pulmonary:      Effort: Pulmonary effort is normal. No respiratory distress.      Breath sounds: Normal breath sounds. No wheezing, rhonchi or rales.   Abdominal:      General: Bowel sounds are normal. There is no distension.      Palpations: Abdomen is soft.      Tenderness: There is no abdominal tenderness.   Musculoskeletal:         General: No swelling.      Right lower leg: No edema.      Left lower leg: No edema.   Skin:     General: Skin is warm and dry.      Capillary Refill: Capillary refill takes less than 2 seconds.   Neurological:      General: No focal deficit present.      Mental Status: She is alert.   Psychiatric:         Mood and Affect: Mood normal.             Labs/Imaging/Cardiac Studies   I have personally reviewed the patient's available lab work, primary care appointment notes, pertinent imaging studies, and cardiac studies and have discussed them and my independent interpretation of those results with the patient and caregiver at this appointment.  All pertinent recent Emergency Department evaluations and Hospital admissions were also reviewed in detail with the patient and caregiver.    Reviewed ECG and Echo, Cath, Holter 2022 OSH    Echo:  Transthoracic Echo (TTE) Complete    Result Date: 6/24/2024        [facility] [place] [address]        Tel [phone] and Fax [phone] TRANSTHORACIC ECHOCARDIOGRAM REPORT  Patient Name:      ERNESTINA RIZVI        Nataliya Physician:    [id number] De La Cruz MD Study Date:        6/24/2024            Ordering Provider:    [id number] DE LA CRUZ MRN/PID:           [id number]             Fellow: Accession#:        [id number]         Nurse: Date of Birth/Age: 1970 / 54 years  Sonographer:          Candida HOOVER,                                                                [facility] Gender:            F                    Additional Staff: Height:            162.56 cm            Admit Date:           6/24/2024 Weight:            73.94 kg             Admission Status:     Outpatient BSA / BMI:         1.79 m2 / 27.98      Encounter#:           [id number]                    kg/m2 Blood Pressure:    125/73 mmHg          Department Location:  [facility] Study Type:    TRANSTHORACIC ECHO (TTE) COMPLETE Diagnosis/ICD: Nonrheumatic tricuspid (valve) insufficiency-I36.1 Indication:    TR CPT Code:      Echo Complete w Full Doppler-24657  Study Detail: The following Echo studies were performed: 2D, M-Mode, Doppler and               color flow. A bubble study was not performed.  PHYSICIAN INTERPRETATION: Left Ventricle: The left ventricular systolic function is normal, with a Albrecht's biplane calculated ejection fraction of 57%. There are no regional wall motion abnormalities. The left ventricular cavity size is normal. Spectral Doppler shows an impaired relaxation pattern of left ventricular diastolic filling. Left Atrium: The left atrium is normal in size. Right Ventricle: The right ventricle is normal in size. There is normal right ventricular global systolic function. Right Atrium: The right atrium is normal in size. Aortic Valve: The aortic valve appears structurally normal. There is no evidence of aortic valve regurgitation. The peak instantaneous gradient of the aortic valve is 7.6 mmHg. Mitral Valve: The mitral valve is normal in structure. There is mild mitral valve regurgitation. Tricuspid Valve: The tricuspid valve is structurally normal. There is mild tricuspid regurgitation. Pulmonic Valve: The pulmonic valve is structurally normal. There is no indication of pulmonic valve regurgitation. Pericardium: There is no pericardial effusion noted. Aorta: The aortic root is normal. In comparison to the previous echocardiogram(s): Compared with study dated 12/28/2023, no  significant change.  CONCLUSIONS:  1. The left ventricular systolic function is normal, with a Albrecht's biplane calculated ejection fraction of 57%.  2. Spectral Doppler shows an impaired relaxation pattern of left ventricular diastolic filling. QUANTITATIVE DATA SUMMARY: 2D MEASUREMENTS:                          Normal Ranges: LAs:           3.44 cm   (2.7-4.0cm) RVIDd:         2.06 cm   (0.9-3.6cm) IVSd:          1.15 cm   (0.6-1.1cm) LVPWd:         0.77 cm   (0.6-1.1cm) LVIDd:         4.64 cm   (3.9-5.9cm) LVIDs:         2.67 cm LV Mass Index: 85.3 g/m2 LV % FS        42.4 % LA VOLUME:                               Normal Ranges: LA Vol A4C:        38.2 ml    (22+/-6mL/m2) LA Vol A2C:        67.7 ml LA Vol BP:         55.6 ml LA Vol Index A4C:  21.3 ml/m2 LA Vol Index A2C:  37.7 ml/m2 LA Vol Index BP:   31.0 ml/m2 LA Area A4C:       16.2 cm2 LA Area A2C:       19.8 cm2 LA Major Axis A4C: 5.8 cm LA Major Axis A2C: 4.9 cm LA Vol A4C:        35.8 ml LA Vol A2C:        65.1 ml RA VOLUME BY A/L METHOD:                               Normal Ranges: RA Vol A4C:        31.2 ml    (8.3-19.5ml) RA Vol Index A4C:  17.4 ml/m2 RA Area A4C:       13.7 cm2 RA Major Axis A4C: 5.2 cm LV SYSTOLIC FUNCTION BY 2D PLANIMETRY (MOD):                      Normal Ranges: EF-A4C View:    61 % (>=55%) EF-A2C View:    53 % EF-Biplane:     57 % LV EF Reported: 57 % LV DIASTOLIC FUNCTION:                           Normal Ranges: MV Peak E:    0.75 m/s    (0.7-1.2 m/s) MV Peak A:    0.80 m/s    (0.42-0.7 m/s) E/A Ratio:    0.93        (1.0-2.2) MV e'         0.091 m/s   (>8.0) MV lateral e' 0.11 m/s MV medial e'  0.08 m/s MV A Dur:     116.08 msec E/e' Ratio:   8.23        (<8.0) MITRAL VALVE:                 Normal Ranges: MV DT: 260 msec (150-240msec) AORTIC VALVE:                         Normal Ranges: AoV Vmax:      1.38 m/s (<=1.7m/s) AoV Peak P.6 mmHg (<20mmHg) LVOT Max Juan Pablo:  1.09 m/s (<=1.1m/s) LVOT VTI:      24.04 cm LVOT  Diameter: 1.97 cm  (1.8-2.4cm) AoV Area,Vmax: 2.41 cm2 (2.5-4.5cm2)  RIGHT VENTRICLE: RV Basal 2.74 cm RV Mid   2.06 cm RV Major 6.6 cm TAPSE:   21.7 mm RV s'    0.15 m/s TRICUSPID VALVE/RVSP:                             Normal Ranges: Peak TR Velocity: 2.51 m/s Est. RA Pressure: 3 mmHg RV Syst Pressure: 28.3 mmHg (< 30mmHg) IVC Diam:         1.83 cm PULMONIC VALVE:                      Normal Ranges: PV Max Juan Pablo: 0.9 m/s  (0.6-0.9m/s) PV Max PG:  3.4 mmHg AORTA: Asc Ao Diam 2.98 cm  [id number] Farrukh Segundo MD Electronically signed on 6/24/2024 at 9:11:12 AM  ** Final **         Assessment and Recommendations     Assessment/Plan       1. PAC (premature atrial contraction) (Primary)  She had a Holter in 2022 with very rare PAC's but ECG recently frequent PAC's so flecainide started with initial good result.  Will recheck Holter prior to increasing Flecainide.  - ECG 12 Lead         Farrukh Segundo MD    Exclusive of any other services or procedures performed, I, Farrukh Segundo MD , spent 30 minutes in duration for this visit today.  This time consisted of chart review, obtaining history, and/or performing the exam as documented above as well as documenting the clinical information for the encounter in the electronic record, discussing treatment options, plans, and/or goals with patient, family, and/or caregiver, refilling medications, updating the electronic record, ordering medicines, lab work, imaging, referrals, and/or procedures as documented above and communicating with other [facility] professionals. I have discussed the results of laboratory, radiology, and cardiology studies with the patient and their family/caregiver.

## 2025-01-20 ENCOUNTER — TELEPHONE (OUTPATIENT)
Dept: CARDIOLOGY | Facility: CLINIC | Age: 55
End: 2025-01-20

## 2025-01-20 NOTE — TELEPHONE ENCOUNTER
----- Message from Farrukh Segundo sent at 1/20/2025 11:08 AM EST -----  Holter Rare PAC's - stay on same meds.  ----- Message -----  From: Farrukh Segundo MD  Sent: 1/20/2025  11:05 AM EST  To: Farrukh Segundo MD

## 2025-04-01 NOTE — Clinical Note
Multiple views of the right coronary artery obtained using power injection. Writer called [facility] to clarify if they have Prednisone 50mg tablets prior to sending script to pharmacy. Pharmacy staff confirmed that they DO have medication is stock. Writer sent script for Prednisone to pharmacy. Charla, patient's daughter, called and made aware that script has been sent. Daughter also asked that patient goes home from RN check tomorrow with AVS. Appointment notes updated to reflect printing AVS after tomorrow's RN Check.    All questions answered. Patient's daughter verbalized understanding

## 2025-04-02 ENCOUNTER — TELEPHONE (OUTPATIENT)
Dept: CARDIOLOGY | Facility: CLINIC | Age: 55
End: 2025-04-02
Payer: COMMERCIAL

## 2025-04-02 ENCOUNTER — OFFICE VISIT (OUTPATIENT)
Dept: PULMONOLOGY | Facility: CLINIC | Age: 55
End: 2025-04-02
Payer: COMMERCIAL

## 2025-04-02 VITALS
TEMPERATURE: 97.4 F | OXYGEN SATURATION: 98 % | DIASTOLIC BLOOD PRESSURE: 69 MMHG | HEART RATE: 76 BPM | HEIGHT: 64 IN | RESPIRATION RATE: 18 BRPM | WEIGHT: 162 LBS | BODY MASS INDEX: 27.66 KG/M2 | SYSTOLIC BLOOD PRESSURE: 106 MMHG

## 2025-04-02 DIAGNOSIS — R06.02 SHORTNESS OF BREATH: Primary | ICD-10-CM

## 2025-04-02 DIAGNOSIS — R60.9 EDEMA, UNSPECIFIED TYPE: Primary | ICD-10-CM

## 2025-04-02 DIAGNOSIS — R06.02 SHORTNESS OF BREATH: ICD-10-CM

## 2025-04-02 PROCEDURE — 3008F BODY MASS INDEX DOCD: CPT | Performed by: STUDENT IN AN ORGANIZED HEALTH CARE EDUCATION/TRAINING PROGRAM

## 2025-04-02 PROCEDURE — 99213 OFFICE O/P EST LOW 20 MIN: CPT | Performed by: STUDENT IN AN ORGANIZED HEALTH CARE EDUCATION/TRAINING PROGRAM

## 2025-04-02 PROCEDURE — 1036F TOBACCO NON-USER: CPT | Performed by: STUDENT IN AN ORGANIZED HEALTH CARE EDUCATION/TRAINING PROGRAM

## 2025-04-02 RX ORDER — ALBUTEROL SULFATE 90 UG/1
2 INHALANT RESPIRATORY (INHALATION) EVERY 4 HOURS PRN
Qty: 8.5 G | Refills: 5 | Status: SHIPPED | OUTPATIENT
Start: 2025-04-02 | End: 2025-04-02 | Stop reason: SDUPTHER

## 2025-04-02 RX ORDER — ALBUTEROL SULFATE 90 UG/1
2 INHALANT RESPIRATORY (INHALATION) EVERY 4 HOURS PRN
Qty: 8.5 G | Refills: 5 | Status: SHIPPED | OUTPATIENT
Start: 2025-04-02 | End: 2025-04-02 | Stop reason: WASHOUT

## 2025-04-02 RX ORDER — ALBUTEROL SULFATE 90 UG/1
2 INHALANT RESPIRATORY (INHALATION) EVERY 4 HOURS PRN
Qty: 8.5 G | Refills: 5 | Status: SHIPPED | OUTPATIENT
Start: 2025-04-02 | End: 2026-04-02

## 2025-04-02 ASSESSMENT — ASTHMA QUESTIONNAIRES
QUESTION_2 LAST FOUR WEEKS HOW OFTEN HAVE YOU HAD SHORTNESS OF BREATH: 1 OR 2 TIMES PER WEEK
ACT_TOTALSCORE: 22
QUESTION_1 LAST FOUR WEEKS HOW MUCH OF THE TIME DID YOUR ASTHMA KEEP YOU FROM GETTING AS MUCH DONE AT WORK, SCHOOL OR AT HOME: NONE OF THE TIME
QUESTION_3 LAST FOUR WEEKS HOW OFTEN DID YOUR ASTHMA SYMPTOMS (WHEEZING, COUGHING, SHORTNESS OF BREATH, CHEST TIGHTNESS OR PAIN) WAKE YOU UP AT NIGHT OR EARLIER THAN USUAL IN THE MORNING: NOT AT ALL
QUESTION_4 LAST FOUR WEEKS HOW OFTEN HAVE YOU USED YOUR RESCUE INHALER OR NEBULIZER MEDICATION (SUCH AS ALBUTEROL): ONCE A WEEK OR LESS
QUESTION_5 LAST FOUR WEEKS HOW WOULD YOU RATE YOUR ASTHMA CONTROL: WELL CONTROLLED

## 2025-04-02 ASSESSMENT — ENCOUNTER SYMPTOMS
CHILLS: 0
ABDOMINAL DISTENTION: 0
PALPITATIONS: 1
ARTHRALGIAS: 0
UNEXPECTED WEIGHT CHANGE: 0
ABDOMINAL PAIN: 0
FEVER: 0

## 2025-04-02 NOTE — PATIENT INSTRUCTIONS
Thank you for visiting the Pulmonary Clinic today.   Your breathing medications: continue albuterol before activity   Tests: lab work to check for autoimmune conditions   Return in 6 months   If you have questions or concerns, call [phone] (option 4)

## 2025-04-02 NOTE — PROGRESS NOTES
Department of Medicine I Division of Pulmonary, Critical Care, and Sleep Medicine   [id number] [place] [address]  Phone: [phone]  Fax: [phone]    History of Present Illness   Antonia Reeves is a 54 y.o. female presenting with dyspnea      4/2025   Since the last visit   Feeling ok from dyspnea standpoint   Does take albuterol before activity which helps   Does sometimes notice trouble with taking a deep breath when laying down at night --did not receive home sleep study   Does note that her ankles get swollen to right above the ankles, notes that her ankles are black and purple--this is after she has been on her feet all day; this is not as evident in the morning   She does have intermittent swelling in the fingers as well         7/3/2024   Since the last visit   She had CPET done with spirometry at peak exercise--no obstruction noted, there was suggestion of perhaps cardiac limitation--underwent LHC and RHC which were essentially normal (PW was borderline). Also had repeat echo done which suggested impaired relaxation of diastolic filling  She is here for follow up   Symptoms: Started to exercise more which was improving symptoms   Stopped metoprolol -->switched to flecainide for her PAC  ?Pulmonary rehab--has not enrolled just yet   Does endorse some daytime fatigue and orthopnea   Swelling is improved   Pulmonary medications: albuterol prn     4/01/2024  Referred by:  Dr. Segundo for dyspnea. I have independently interviewed and examined the patient in the office and reviewed available records.    Follows with Dr. Segundo for TR, PAC's, tentative plan for LHC/RHC     Previously seen by Dr. Choudhury in April 2023 for similar symptoms--workup at the time included PFT (which was normal) and walk test (which was normal)--echo with moderate TR at the time, repeat demonstrates mild TR    Endorses a history of asthma many years ago--not currently on inhalers     Did have covid  infection in August 2021   Did notice after covid infection having more frequent PAC and trouble with her breathing. Feels like she has not gotten back to baseline     Does endorse swelling in her hands and feet   Has also endorsed a rapid weight gain after menopause     Dyspnea happens with exertion---ie with stairs, also when her heart races, orthopnea; denies PND  Denies any cough   Occasional post nasal drip   Denies any rash   Pulmonary medications: none       Review of Systems  Review of Systems   Constitutional:  Negative for chills, fever and unexpected weight change.   Respiratory:          As per hpi   Cardiovascular:  Positive for palpitations. Negative for chest pain and leg swelling.   Gastrointestinal:  Negative for abdominal distention and abdominal pain.   Musculoskeletal:  Negative for arthralgias and gait problem.   Skin:  Negative for rash.     All other review of systems are negative and/or non-contributory.    Past Medical History   She has a past medical history of Nonrheumatic tricuspid valve regurgitation (12/19/2023) and PAC (premature atrial contraction) (12/19/2023).    Immunizations     Immunization History   Administered Date(s) Administered    COVID-19, mRNA, LNP-S, PF, 30 mcg/0.3 mL dose 12/01/2021, 12/28/2021       Medications and Allergies     Current Outpatient Medications   Medication Instructions    albuterol (ProAir HFA) 90 mcg/actuation inhaler 2 puffs, inhalation, Every 4 hours PRN    flecainide (TAMBOCOR) 50 mg, oral, 2 times daily    SUMAtriptan (Imitrex) 20 mg/actuation nasal spray Every 24 hours      Patient has no known allergies.    Social History   She reports that she has quit smoking. Her smoking use included cigarettes. She has a 2.5 pack-year smoking history. She has never used smokeless tobacco. She reports current alcohol use. She reports that she does not use drugs.    Smoking History: 10 year smoking history (quit 25 years ago). No vaping. Denies any  methamphetamine use.  Remote marijuana and cocaine use. 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  Has one dog at home and two cats       Family History   Mom:  bullous pemphigoid (was on infusions)   Dad: passed when she was young (not sure of any medical conditions)         Surgical History   She has a past surgical history that includes Appendectomy;  section, classic; and Cardiac catheterization (N/A, 5/10/2024).    Physical Exam     There were no vitals filed for this visit.  Vitals:    25 1004   BP: 106/69   Pulse: 76   Resp: 18   Temp: 36.3 °C (97.4 °F)   SpO2: 98%            Physical Exam  Vitals reviewed.   Constitutional:       General: She is awake.   Cardiovascular:      Rate and Rhythm: Normal rate and regular rhythm.   Pulmonary:      Effort: Pulmonary effort is normal.      Breath sounds: Normal breath sounds.   Neurological:      Mental Status: She is alert and oriented to person, place, and time.   Psychiatric:         Attention and Perception: Attention and perception normal.         Behavior: Behavior normal.        Results   Pulmonary Function Tests:  3/24/2023  FEV1/FVC: 77 FEV1: 105% predicted  T% RV/T% pred   DLCO: 115% predicted     Chest Radiograph:  CHEST 2 VIEW     Narrative  Interpreted By:  CHRISTIANO HOLT MD  MRN: [id number]  Patient Name: ERNESTINA RIZVI    STUDY:  TH CHEST 2 VIEW PA AND LAT;  2023 10:03 am    INDICATION:  DCH02.    COMPARISON:  None.    ACCESSION NUMBER(S):  [id number]    ORDERING CLINICIAN:  LISA CARLOS    FINDINGS:  CARDIOMEDIASTINAL SILHOUETTE AND VASCULATURE:    Cardiac size:  Within normal limits considering technique    Aortic shadow:  Within normal limits considering technique    Mediastinal contours: Within normal limits considering technique    Pulmonary vasculature:  The central vasculature is unremarkable    LUNGS:  Lungs are clear.    ABDOMEN AND OTHER FINDINGS:  No  remarkable upper abdominal findings.    BONES:  No acute osseous changes.    Impression  1.  No active cardiopulmonary disease.      Chest CT Scan:  No results found for this or any previous visit from the past 365 days.       ECHO:  12/2023  PHYSICIAN INTERPRETATION:  Left Ventricle: The left ventricular systolic function is normal. There are no regional wall motion abnormalities. The left ventricular cavity size is normal. Spectral Doppler shows an impaired relaxation pattern of left ventricular diastolic filling.  Left Atrium: The left atrium is mildly dilated.  Right Ventricle: The right ventricle is upper limits of normal in size. There is normal right ventricular global systolic function.  Right Atrium: The right atrium is normal in size.  Aortic Valve: The aortic valve appears structurally normal. There is no evidence of aortic valve regurgitation. The peak instantaneous gradient of the aortic valve is 8.1 mmHg.  Mitral Valve: The mitral valve is normal in structure. There is mild mitral valve regurgitation.  Tricuspid Valve: The tricuspid valve is structurally normal. There is mild tricuspid regurgitation.  Pulmonic Valve: The pulmonic valve is structurally normal. There is no indication of pulmonic valve regurgitation.  Pericardium: There is no pericardial effusion noted.  Aorta: The aortic root is normal.  In comparison to the previous echocardiogram(s): There are no prior studies on this patient for comparison purposes.        CONCLUSIONS:   1. Left ventricular systolic function is normal.   2. Spectral Doppler shows an impaired relaxation pattern of left ventricular diastolic filling.     CPET 4/18/2024  Patient Performance:     Cardiac Response:  The patient developed no symptoms during the stress exam. The peak heart rate achieved was 124 bpm, which was 75 % of the age predicted target heart rate of 165 bpm. There is a good exercise effort as reflected in the peak exercise RER. Appropriate blood  pressure response to exercise. Heart rate reserve:41 bpm. The O2 pulse (VO2/HR) was 4 ml/beat at rest and increased to 8 ml/beat at peak exercise. Oxygen saturation: stable. Subjective assessment of effort: good.     EKG:  Resting ECG showed normal sinus rhythm with normal tracing, rare premature ventricular contractions and rare premature atrial contractions. Stress ECG showed sinus tachycardia, with no abnormal findings.     Gas Exchange:  The peak VO2 achieved is 14.1 ml/Kg/min, which is 68% of the predicted maximum. Soares functional class C (moderate to severe impairment). A ventilatory threshold of 14.1 occurred at 68% of peak VO2. The perceived maximal exertion by RPE scale was 20--Maximal exertion. The dyspnea rating at peak exercise was 2--Slight. The patient's oxygen saturation was 94 at rest and 96 at peak exercise.     Ventilatory Response:  The ventilatory efficiency slope at peak exercise was 27. The patient's resting minute ventilation (VE) was 11 liters/minute and increased to 33.70 liters/minute at peak exercise which is 34% of predicted. Ventilatory anaerobic threshold 9 (reached at 64% of predicted). The respiratory rate at rest was 21 breaths/minute and increased to 32 breaths/minute at peak exercise. Ventilatory reserve (VE max/MVV): 0.28.        Impression:      1. Abnormal cardiopulmonary exercise test.   2. Soares functional class C (moderate to severe impairment).   3. Probable cause of functional impairment: Cardiac.   4. RER > 1 indicates good effort.   5. Peak VO2 of 14.1 mL/kg/min and/or VE/VC02 of 27 indicates moderate risk of complications.   6. Repeat testing in future as clinically indicated.       [facility]  5/10/2024   PW 15   PA: 29/15  meanPAP 21   CO: 5.5  CI: 3.1   Labs:  Lab Results   Component Value Date    WBC 3.5 (L) 04/23/2024    HGB 13.6 04/23/2024    HCT 40.8 04/23/2024    MCV 93 04/23/2024     04/23/2024       Lab Results   Component Value Date    CREATININE 0.61  "04/23/2024    BUN 17 04/23/2024     04/23/2024    K 4.0 04/23/2024     04/23/2024    CO2 25 04/23/2024        No results found for: \"ZORAN\", \"RF\", \"SEDRATE\"     IgE: No results found for: \"IGE\"   Respiratory Allergy Panel:  AEC:   Eosinophils Absolute (x10*3/uL)   Date Value   04/23/2024 0.06     Eosinophils % (%)   Date Value   04/23/2024 1.7       Cultures:  No results found for: \"AFBCX\"   No results found for: \"RESPCULTCYFI\"    No results found for the last 90 days.  C     Assessment and Plan       Antonia Reeves is a 54 y.o. year old female patient with  with prior history of asthma, PAC, Tricuspid regurgitation and persistent dyspnea following covid infection in August 2021--mostly with activity.  PFTs and walk test are unremarkable.  She is following with cardiology and is on metoprolol for her PAC,  CPET, LHC/RHC are unrevealing, pt does report some improved symptoms-trying to be more active .      Recommendations   Albuterol inh prior to activity   Autoimmune screening for the discoloration/ankle swelling and finger swelling  HSAT to investigate for KAYLA --pt prefers to hold off at the moment   Continue to stay active               Follow-up:  6  months or sooner     Courtney Temple MD  04/02/2025    "

## 2025-04-03 LAB
ANA SER QL IF: NEGATIVE
CCP IGG SERPL-ACNC: <16 UNITS
CRP SERPL-MCNC: <3 MG/L
ERYTHROCYTE [SEDIMENTATION RATE] IN BLOOD BY WESTERGREN METHOD: 6 MM/H
RHEUMATOID FACT SERPL-ACNC: <10 IU/ML

## 2025-06-05 ENCOUNTER — TELEPHONE (OUTPATIENT)
Dept: CARDIOLOGY | Facility: CLINIC | Age: 55
End: 2025-06-05
Payer: COMMERCIAL

## 2025-06-05 NOTE — TELEPHONE ENCOUNTER
Signed letter for patient to join [facility] to exercise has been scanned in.  I spoke to patient and she stated that she was able to open the unsigned letter and [facility] took it.

## 2025-08-23 DIAGNOSIS — I36.1 NONRHEUMATIC TRICUSPID VALVE REGURGITATION: ICD-10-CM

## 2025-08-23 DIAGNOSIS — I49.1 PAC (PREMATURE ATRIAL CONTRACTION): ICD-10-CM

## 2025-08-25 DIAGNOSIS — I36.1 NONRHEUMATIC TRICUSPID VALVE REGURGITATION: ICD-10-CM

## 2025-08-25 DIAGNOSIS — I49.1 PAC (PREMATURE ATRIAL CONTRACTION): ICD-10-CM

## 2025-08-25 RX ORDER — FLECAINIDE ACETATE 50 MG/1
50 TABLET ORAL 2 TIMES DAILY
Qty: 180 TABLET | Refills: 1 | Status: SHIPPED | OUTPATIENT
Start: 2025-08-25 | End: 2025-08-25 | Stop reason: SDUPTHER

## 2025-08-25 RX ORDER — FLECAINIDE ACETATE 50 MG/1
50 TABLET ORAL 2 TIMES DAILY
Qty: 180 TABLET | Refills: 1 | Status: SHIPPED | OUTPATIENT
Start: 2025-08-25

## 2025-10-08 ENCOUNTER — APPOINTMENT (OUTPATIENT)
Dept: PULMONOLOGY | Facility: CLINIC | Age: 55
End: 2025-10-08
Payer: COMMERCIAL

## (undated) DEVICE — GLIDESHEATH, SLENDER 6FR, 10CM, NITINOL KIT

## (undated) DEVICE — GUIDEWIRE, INQWIRE, 3MM J, .035, 260

## (undated) DEVICE — CATHETER, WEDGE PRESSURE, BALLOON, DOUBLE LUMEN, 5 FR, 110 CM

## (undated) DEVICE — GUIDEWIRE, INQWIRE, 0.025 X 150CM, FIXED CORE, 3MM J-TIP

## (undated) DEVICE — CATHETER, DIAGNOSTIC, 4 FR-JR 4

## (undated) DEVICE — TR BAND, RADIAL COMPRESSION, STANDARD, 24CM

## (undated) DEVICE — INTRODUCER, GLIDESHEATH SLENDER A-KIT, 5FR 10CM

## (undated) DEVICE — CATHETER, DIAGNOSTIC, 4 FR-JL 3.5

## (undated) DEVICE — GUIDEWIRE, HI-TORQUE, VERSACORE, 260CM, FLOPPY